# Patient Record
Sex: FEMALE | Race: WHITE | Employment: UNEMPLOYED | URBAN - METROPOLITAN AREA
[De-identification: names, ages, dates, MRNs, and addresses within clinical notes are randomized per-mention and may not be internally consistent; named-entity substitution may affect disease eponyms.]

---
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
## 2022-02-20 ENCOUNTER — HOSPITAL ENCOUNTER (EMERGENCY)
Age: 22
Discharge: HOME | End: 2022-02-20
Attending: EMERGENCY MEDICINE
Payer: COMMERCIAL

## 2022-02-20 VITALS
SYSTOLIC BLOOD PRESSURE: 132 MMHG | TEMPERATURE: 98.2 F | WEIGHT: 180 LBS | OXYGEN SATURATION: 97 % | RESPIRATION RATE: 18 BRPM | HEIGHT: 62 IN | DIASTOLIC BLOOD PRESSURE: 68 MMHG | HEART RATE: 63 BPM | BODY MASS INDEX: 33.13 KG/M2

## 2022-02-20 DIAGNOSIS — R11.2 INTRACTABLE VOMITING WITH NAUSEA, UNSPECIFIED VOMITING TYPE: ICD-10-CM

## 2022-02-20 DIAGNOSIS — F41.1 ANXIETY STATE: Primary | ICD-10-CM

## 2022-02-20 LAB
ANION GAP SERPL CALCULATED.3IONS-SCNC: 17 MEQ/L (ref 9–15)
BACTERIA: ABNORMAL /HPF
BASOPHILS ABSOLUTE: 0 K/UL (ref 0–0.1)
BASOPHILS RELATIVE PERCENT: 0.3 % (ref 0.1–1.2)
BILIRUBIN URINE: NEGATIVE
BLOOD, URINE: NEGATIVE
BUN BLDV-MCNC: 10 MG/DL (ref 6–20)
CALCIUM SERPL-MCNC: 9.3 MG/DL (ref 8.5–9.9)
CHLORIDE BLD-SCNC: 103 MEQ/L (ref 95–107)
CLARITY: CLEAR
CO2: 22 MEQ/L (ref 20–31)
COLOR: YELLOW
CREAT SERPL-MCNC: 0.74 MG/DL (ref 0.5–0.9)
EOSINOPHILS ABSOLUTE: 0 K/UL (ref 0–0.4)
EOSINOPHILS RELATIVE PERCENT: 0 % (ref 0.7–5.8)
EPITHELIAL CELLS, UA: ABNORMAL /HPF
GFR AFRICAN AMERICAN: >60
GFR NON-AFRICAN AMERICAN: >60
GLUCOSE BLD-MCNC: 95 MG/DL (ref 70–99)
GLUCOSE URINE: NEGATIVE MG/DL
HCG(URINE) PREGNANCY TEST: NEGATIVE
HCT VFR BLD CALC: 35.9 % (ref 37–47)
HEMOGLOBIN: 11.8 G/DL (ref 11.2–15.7)
IMMATURE GRANULOCYTES #: 0.1 K/UL
IMMATURE GRANULOCYTES %: 0.6 %
KETONES, URINE: 80 MG/DL
LEUKOCYTE ESTERASE, URINE: NEGATIVE
LYMPHOCYTES ABSOLUTE: 0.7 K/UL (ref 1.2–3.7)
LYMPHOCYTES RELATIVE PERCENT: 5.1 %
MCH RBC QN AUTO: 28.1 PG (ref 25.6–32.2)
MCHC RBC AUTO-ENTMCNC: 32.9 % (ref 32.2–35.5)
MCV RBC AUTO: 85.5 FL (ref 79.4–94.8)
MONOCYTES ABSOLUTE: 0.4 K/UL (ref 0.2–0.9)
MONOCYTES RELATIVE PERCENT: 3 % (ref 4.7–12.5)
NEUTROPHILS ABSOLUTE: 13.3 K/UL (ref 1.6–6.1)
NEUTROPHILS RELATIVE PERCENT: 91 % (ref 34–71.1)
NITRITE, URINE: NEGATIVE
PDW BLD-RTO: 13.3 % (ref 11.7–14.4)
PH UA: >=9 (ref 5–9)
PLATELET # BLD: 463 K/UL (ref 182–369)
POTASSIUM SERPL-SCNC: 3.7 MEQ/L (ref 3.4–4.9)
PROTEIN UA: 100 MG/DL
RBC # BLD: 4.2 M/UL (ref 3.93–5.22)
RBC UA: ABNORMAL /HPF (ref 0–2)
SODIUM BLD-SCNC: 142 MEQ/L (ref 135–144)
SPECIFIC GRAVITY UA: 1.01 (ref 1–1.03)
URINE REFLEX TO CULTURE: ABNORMAL
UROBILINOGEN, URINE: 0.2 E.U./DL
WBC # BLD: 14.6 K/UL (ref 4–10)
WBC UA: ABNORMAL /HPF (ref 0–5)

## 2022-02-20 PROCEDURE — 85025 COMPLETE CBC W/AUTO DIFF WBC: CPT

## 2022-02-20 PROCEDURE — 36415 COLL VENOUS BLD VENIPUNCTURE: CPT

## 2022-02-20 PROCEDURE — 2580000003 HC RX 258: Performed by: EMERGENCY MEDICINE

## 2022-02-20 PROCEDURE — 80048 BASIC METABOLIC PNL TOTAL CA: CPT

## 2022-02-20 PROCEDURE — 81001 URINALYSIS AUTO W/SCOPE: CPT

## 2022-02-20 PROCEDURE — 99282 EMERGENCY DEPT VISIT SF MDM: CPT

## 2022-02-20 PROCEDURE — 96374 THER/PROPH/DIAG INJ IV PUSH: CPT

## 2022-02-20 PROCEDURE — 6360000002 HC RX W HCPCS: Performed by: EMERGENCY MEDICINE

## 2022-02-20 PROCEDURE — 84703 CHORIONIC GONADOTROPIN ASSAY: CPT

## 2022-02-20 RX ORDER — ONDANSETRON 4 MG/1
4 TABLET, ORALLY DISINTEGRATING ORAL EVERY 8 HOURS PRN
Qty: 30 TABLET | Refills: 0 | Status: SHIPPED | OUTPATIENT
Start: 2022-02-20 | End: 2022-03-27

## 2022-02-20 RX ORDER — ONDANSETRON 2 MG/ML
4 INJECTION INTRAMUSCULAR; INTRAVENOUS ONCE
Status: COMPLETED | OUTPATIENT
Start: 2022-02-20 | End: 2022-02-20

## 2022-02-20 RX ORDER — 0.9 % SODIUM CHLORIDE 0.9 %
1000 INTRAVENOUS SOLUTION INTRAVENOUS ONCE
Status: COMPLETED | OUTPATIENT
Start: 2022-02-20 | End: 2022-02-20

## 2022-02-20 RX ORDER — SODIUM CHLORIDE 0.9 % (FLUSH) 0.9 %
3 SYRINGE (ML) INJECTION EVERY 8 HOURS
Status: DISCONTINUED | OUTPATIENT
Start: 2022-02-20 | End: 2022-02-20 | Stop reason: HOSPADM

## 2022-02-20 RX ADMIN — Medication 3 ML: at 17:38

## 2022-02-20 RX ADMIN — SODIUM CHLORIDE 1000 ML: 9 INJECTION, SOLUTION INTRAVENOUS at 17:38

## 2022-02-20 RX ADMIN — ONDANSETRON 4 MG: 2 INJECTION INTRAMUSCULAR; INTRAVENOUS at 17:38

## 2022-02-20 ASSESSMENT — ENCOUNTER SYMPTOMS
NAUSEA: 1
SORE THROAT: 0
EYE DISCHARGE: 0
SHORTNESS OF BREATH: 0
ABDOMINAL PAIN: 1
COUGH: 0
BACK PAIN: 0
VOMITING: 1
EYE REDNESS: 0

## 2022-02-20 ASSESSMENT — PAIN DESCRIPTION - DESCRIPTORS: DESCRIPTORS: STABBING

## 2022-02-20 ASSESSMENT — PAIN SCALES - GENERAL: PAINLEVEL_OUTOF10: 5

## 2022-02-20 ASSESSMENT — PAIN DESCRIPTION - LOCATION: LOCATION: ABDOMEN;THROAT

## 2022-02-20 ASSESSMENT — PAIN DESCRIPTION - FREQUENCY: FREQUENCY: CONTINUOUS

## 2022-02-20 ASSESSMENT — PAIN DESCRIPTION - ONSET: ONSET: ON-GOING

## 2022-02-20 ASSESSMENT — PAIN DESCRIPTION - PAIN TYPE: TYPE: ACUTE PAIN

## 2022-02-20 NOTE — ED PROVIDER NOTES
[address] [place] [address] ED  EMERGENCY DEPARTMENT ENCOUNTER      Pt Name: Ameena Moore  MRN: [id number]  [place] 2000  Date of evaluation: 2/20/2022  Provider: Benedicto Ruiz DO        HISTORY OF PRESENT ILLNESS    Ameena Moore is a 25 y.o. female per chart review has ah/o depression, anxiety, otitis media, URI, verucca. Presents with nausea and vomiting from using marijuana. The history is provided by the patient. Nausea & Vomiting  Severity:  Moderate  Duration:  12 hours  Timing:  Constant  Quality:  Undigested food  Progression:  Unchanged  Chronicity:  New  Recent urination:  Normal  Relieved by:  Nothing  Worsened by:  Nothing  Ineffective treatments:  None tried  Associated symptoms: abdominal pain    Associated symptoms: no chills, no cough, no fever and no sore throat             REVIEW OF SYSTEMS       Review of Systems   Constitutional: Negative for chills and fever. HENT: Negative for ear pain and sore throat. Eyes: Negative for discharge and redness. Respiratory: Negative for cough and shortness of breath. Cardiovascular: Negative for chest pain and palpitations. Gastrointestinal: Positive for abdominal pain, nausea and vomiting. Genitourinary: Negative for difficulty urinating and dysuria. Musculoskeletal: Negative for back pain and neck pain. Skin: Negative for rash and wound. Neurological: Negative for dizziness and syncope. Psychiatric/Behavioral: Negative for confusion. The patient is not nervous/anxious. All other systems reviewed and are negative. Except as noted above the remainder of the review of systems was reviewed and negative.        PAST MEDICAL HISTORY     Past Medical History:   Diagnosis Date    Eustachian tube dysfunction     Otitis media     URI (upper respiratory infection)     Verruca     right distal phalanax         SURGICAL HISTORY       Past Surgical History:   Procedure Laterality Date    ANKLE FRACTURE SURGERY Right 2/3/2021 RIGHT ANKLE OPEN REDUCTION INTERNAL FIXATION, I & D OPEN FRACTURE performed by Ana Maria Martinez MD at [address] [facility]       Discharge Medication List as of 2022  6:42 PM      CONTINUE these medications which have NOT CHANGED    Details   Handicap Placard MISC Starting Wed 3/31/2021, Disp-1 each, R-0, Print3 months      dicyclomine (BENTYL) 10 MG capsule Take 1 capsule by mouth every 6 hours as needed (cramps), Disp-20 capsule, R-0Normal      ondansetron (ZOFRAN) 4 MG tablet Take 1 tablet by mouth every 8 hours as needed for Nausea, Disp-20 tablet, R-0Normal      naproxen (NAPROSYN) 500 MG tablet Take 1 tablet by mouth 2 times daily for 20 doses, Disp-20 tablet, R-0Normal      oxyCODONE-acetaminophen (PERCOCET) 5-325 MG per tablet Take 1 tablet by mouth every 6 hours as needed for Pain. Historical Med             ALLERGIES     Patient has no known allergies.     FAMILY HISTORY       Family History   Problem Relation Age of Onset    Stroke Father     Heart Disease Father          at age 44 Afib, blood clot          SOCIAL HISTORY       Social History     Socioeconomic History    Marital status: Single     Spouse name: Not on file    Number of children: Not on file    Years of education: Not on file    Highest education level: Not on file   Occupational History    Not on file   Tobacco Use    Smoking status: Current Every Day Smoker     Packs/day: 0.50     Years: 1.00     Pack years: 0.50    Smokeless tobacco: Never Used   Vaping Use    Vaping Use: Never used   Substance and Sexual Activity    Alcohol use: No    Drug use: Yes     Types: Marijuana Fredick Copping)    Sexual activity: Yes     Partners: Male   Other Topics Concern    Not on file   Social History Narrative    Not on file     Social Determinants of Health     Financial Resource Strain:     Difficulty of Paying Living Expenses: Not on file   Food Insecurity:     Worried About Running Out of Food in the Last Year: Not on file    [address] in the Last Year: Not on file   Transportation Needs:     Lack of Transportation (Medical): Not on file    Lack of Transportation (Non-Medical): Not on file   Physical Activity:     Days of Exercise per Week: Not on file    Minutes of Exercise per Session: Not on file   Stress:     Feeling of Stress : Not on file   Social Connections:     Frequency of Communication with Friends and Family: Not on file    Frequency of Social Gatherings with Friends and Family: Not on file    Attends Islam Services: Not on file    Active Member of [address] [place] [address] [employer] or Organizations: Not on file    Attends Club or Organization Meetings: Not on file    Marital Status: Not on file   Intimate Partner Violence:     Fear of Current or Ex-Partner: Not on file    Emotionally Abused: Not on file    Physically Abused: Not on file    Sexually Abused: Not on file   Housing Stability:     Unable to Pay for Housing in the Last Year: Not on file    Number of [place] in the Last Year: Not on file    Unstable Housing in the Last Year: Not on file         PHYSICAL EXAM       ED Triage Vitals [02/20/22 1621]   BP Temp Temp Source Heart Rate Resp SpO2 Height Weight - Scale   132/68 98.2 °F (36.8 °C) Temporal 63 18 97 % 5' 2\" (1.575 m) 180 lb (81.6 kg)       Physical Exam  Vitals and nursing note reviewed. Constitutional:       Appearance: Normal appearance. HENT:      Head: Normocephalic and atraumatic. Right Ear: Tympanic membrane normal.      Left Ear: Tympanic membrane normal.      Nose: Nose normal.      Mouth/Throat:      Mouth: Mucous membranes are moist.      Pharynx: Oropharynx is clear. Eyes:      General: Lids are normal.      Extraocular Movements: Extraocular movements intact. Conjunctiva/sclera: Conjunctivae normal.      Pupils: Pupils are equal, round, and reactive to light. Cardiovascular:      Rate and Rhythm: Normal rate and regular rhythm. Pulses: Normal pulses.       Heart Options  Intractable vomiting with nausea, unspecified vomiting type  Diagnosis management comments: Patient presents with nausea, vomiting from using marijuana. Labs, IVF and zofran 4mg IV ordered. The patient feels much better after the medication. She will be discharged home. She will follow up in 2 days with her primary care doctor. Mio Dudley DO     The lab results, radiology and test results were reviewed with the patient and family. The patient will follow up in 2 days with their primary care doctor. If their symptoms change or get worse they will return to the ER. CRITICAL CARE TIME   Total CriticalCare time was 0 minutes, excluding separately reportable procedures. There was a high probability of clinically significant/life threatening deterioration in the patient's condition which required my urgent intervention. PROCEDURES:  Unlessotherwise noted below, none     Procedures      FINAL IMPRESSION      1. Anxiety state    2.  Intractable vomiting with nausea, unspecified vomiting type          DISPOSITION/PLAN   DISPOSITION Decision To Discharge 02/20/2022 06:27:47 PM          Mio Dudley DO (electronically signed)  Attending Emergency Physician          Dustin Trotter DO  02/23/22 2003 overriding sutures, vaginal discharge

## 2022-02-20 NOTE — LETTER
[facility] ED  [address]  Phone: [phone]    Patient: Sidney Zuleta  YOB: 2000  Date: 2/20/2022 Time: 6:28 PM    Leaving the Hospital Against Medical Advice    Chart #:[id number]    This will certify that I, the undersigned,    ______________________________________________________________________    A patient in the above named medical center, having requested discharge and removal from the medical center against the advice of my attending physician(s), hereby release the Emergency Department, its physicians, officers and employees, severally and individually, from any and all liability of any nature whatsoever for any injury or harm or complication of any kind that may result directly or indirectly, by reason of my terminating my stay as a patient from [facility], and hereby waive any and all rights of action I may now have or later acquire as a result of my voluntary departure from [facility] and the termination of my stay as a patient therein. This release is made with the full knowledge of the danger that may result from the action which I am taking.       Date:_______________________                         ___________________________                                                                                    Patient/Legal Representative    Witness:        ____________________________                          ___________________________  Nurse                                                                        Physician

## 2022-02-20 NOTE — ED NOTES
Pt is given d/c instructions and one script. Pt voiced understanding of d/c instructions without further questions.       Smooth Garcia RN  02/20/22 4826

## 2022-03-27 ENCOUNTER — HOSPITAL ENCOUNTER (EMERGENCY)
Age: 22
Discharge: HOME | End: 2022-03-27
Attending: EMERGENCY MEDICINE

## 2022-03-27 ENCOUNTER — APPOINTMENT (OUTPATIENT)
Dept: GENERAL RADIOLOGY | Age: 22
End: 2022-03-27

## 2022-03-27 VITALS
HEIGHT: 62 IN | BODY MASS INDEX: 33.13 KG/M2 | OXYGEN SATURATION: 98 % | RESPIRATION RATE: 20 BRPM | WEIGHT: 180 LBS | SYSTOLIC BLOOD PRESSURE: 133 MMHG | DIASTOLIC BLOOD PRESSURE: 84 MMHG | TEMPERATURE: 97.2 F | HEART RATE: 73 BPM

## 2022-03-27 DIAGNOSIS — R10.9 ABDOMINAL PAIN, UNSPECIFIED ABDOMINAL LOCATION: Primary | ICD-10-CM

## 2022-03-27 DIAGNOSIS — R11.2 NON-INTRACTABLE VOMITING WITH NAUSEA, UNSPECIFIED VOMITING TYPE: ICD-10-CM

## 2022-03-27 LAB
ALBUMIN SERPL-MCNC: 4.3 G/DL (ref 3.5–4.6)
ALP BLD-CCNC: 140 U/L (ref 40–130)
ALT SERPL-CCNC: 14 U/L (ref 0–33)
ANION GAP SERPL CALCULATED.3IONS-SCNC: 18 MEQ/L (ref 9–15)
AST SERPL-CCNC: 20 U/L (ref 0–35)
BASOPHILS ABSOLUTE: 0.1 K/UL (ref 0–0.1)
BASOPHILS RELATIVE PERCENT: 0.4 % (ref 0.1–1.2)
BILIRUB SERPL-MCNC: 0.3 MG/DL (ref 0.2–0.7)
BUN BLDV-MCNC: 10 MG/DL (ref 6–20)
CALCIUM SERPL-MCNC: 9.2 MG/DL (ref 8.5–9.9)
CHLORIDE BLD-SCNC: 101 MEQ/L (ref 95–107)
CO2: 21 MEQ/L (ref 20–31)
CREAT SERPL-MCNC: 0.86 MG/DL (ref 0.5–0.9)
EOSINOPHILS ABSOLUTE: 0.1 K/UL (ref 0–0.4)
EOSINOPHILS RELATIVE PERCENT: 0.8 % (ref 0.7–5.8)
GFR AFRICAN AMERICAN: >60
GFR NON-AFRICAN AMERICAN: >60
GLOBULIN: 3 G/DL (ref 2.3–3.5)
GLUCOSE BLD-MCNC: 132 MG/DL (ref 70–99)
HCG QUALITATIVE: NEGATIVE
HCT VFR BLD CALC: 39.8 % (ref 37–47)
HEMOGLOBIN: 13.3 G/DL (ref 11.2–15.7)
IMMATURE GRANULOCYTES #: 0.1 K/UL
IMMATURE GRANULOCYTES %: 0.6 %
LIPASE: 26 U/L (ref 12–95)
LYMPHOCYTES ABSOLUTE: 2.2 K/UL (ref 1.2–3.7)
LYMPHOCYTES RELATIVE PERCENT: 15.3 %
MAGNESIUM: 1.8 MG/DL (ref 1.7–2.2)
MCH RBC QN AUTO: 27.9 PG (ref 25.6–32.2)
MCHC RBC AUTO-ENTMCNC: 33.4 % (ref 32.2–35.5)
MCV RBC AUTO: 83.6 FL (ref 79.4–94.8)
MONOCYTES ABSOLUTE: 0.8 K/UL (ref 0.2–0.9)
MONOCYTES RELATIVE PERCENT: 5.2 % (ref 4.7–12.5)
NEUTROPHILS ABSOLUTE: 11.3 K/UL (ref 1.6–6.1)
NEUTROPHILS RELATIVE PERCENT: 77.7 % (ref 34–71.1)
PDW BLD-RTO: 14.6 % (ref 11.7–14.4)
PLATELET # BLD: 436 K/UL (ref 182–369)
POTASSIUM SERPL-SCNC: 3.9 MEQ/L (ref 3.4–4.9)
RBC # BLD: 4.76 M/UL (ref 3.93–5.22)
SODIUM BLD-SCNC: 140 MEQ/L (ref 135–144)
TOTAL PROTEIN: 7.3 G/DL (ref 6.3–8)
WBC # BLD: 14.5 K/UL (ref 4–10)

## 2022-03-27 PROCEDURE — 96374 THER/PROPH/DIAG INJ IV PUSH: CPT

## 2022-03-27 PROCEDURE — 84703 CHORIONIC GONADOTROPIN ASSAY: CPT

## 2022-03-27 PROCEDURE — 36415 COLL VENOUS BLD VENIPUNCTURE: CPT

## 2022-03-27 PROCEDURE — 80053 COMPREHEN METABOLIC PANEL: CPT

## 2022-03-27 PROCEDURE — 93005 ELECTROCARDIOGRAM TRACING: CPT

## 2022-03-27 PROCEDURE — 96375 TX/PRO/DX INJ NEW DRUG ADDON: CPT

## 2022-03-27 PROCEDURE — 74018 RADEX ABDOMEN 1 VIEW: CPT

## 2022-03-27 PROCEDURE — 83690 ASSAY OF LIPASE: CPT

## 2022-03-27 PROCEDURE — 83735 ASSAY OF MAGNESIUM: CPT

## 2022-03-27 PROCEDURE — 85025 COMPLETE CBC W/AUTO DIFF WBC: CPT

## 2022-03-27 PROCEDURE — 99283 EMERGENCY DEPT VISIT LOW MDM: CPT

## 2022-03-27 PROCEDURE — 6360000002 HC RX W HCPCS: Performed by: EMERGENCY MEDICINE

## 2022-03-27 PROCEDURE — 2580000003 HC RX 258: Performed by: EMERGENCY MEDICINE

## 2022-03-27 PROCEDURE — 96372 THER/PROPH/DIAG INJ SC/IM: CPT

## 2022-03-27 RX ORDER — KETOROLAC TROMETHAMINE 10 MG/1
10 TABLET, FILM COATED ORAL EVERY 6 HOURS PRN
Qty: 20 TABLET | Refills: 0 | Status: SHIPPED | OUTPATIENT
Start: 2022-03-27

## 2022-03-27 RX ORDER — KETOROLAC TROMETHAMINE 30 MG/ML
30 INJECTION, SOLUTION INTRAMUSCULAR; INTRAVENOUS ONCE
Status: COMPLETED | OUTPATIENT
Start: 2022-03-27 | End: 2022-03-27

## 2022-03-27 RX ORDER — ONDANSETRON 4 MG/1
4 TABLET, ORALLY DISINTEGRATING ORAL 3 TIMES DAILY PRN
Qty: 21 TABLET | Refills: 0 | Status: SHIPPED | OUTPATIENT
Start: 2022-03-27

## 2022-03-27 RX ORDER — DIPHENHYDRAMINE HYDROCHLORIDE 50 MG/ML
25 INJECTION INTRAMUSCULAR; INTRAVENOUS ONCE
Status: COMPLETED | OUTPATIENT
Start: 2022-03-27 | End: 2022-03-27

## 2022-03-27 RX ORDER — HALOPERIDOL 5 MG/ML
5 INJECTION INTRAMUSCULAR ONCE
Status: DISCONTINUED | OUTPATIENT
Start: 2022-03-27 | End: 2022-03-27

## 2022-03-27 RX ORDER — METOCLOPRAMIDE HYDROCHLORIDE 5 MG/ML
10 INJECTION INTRAMUSCULAR; INTRAVENOUS ONCE
Status: COMPLETED | OUTPATIENT
Start: 2022-03-27 | End: 2022-03-27

## 2022-03-27 RX ORDER — PROMETHAZINE HYDROCHLORIDE 25 MG/1
25 TABLET ORAL 4 TIMES DAILY PRN
Qty: 20 TABLET | Refills: 0 | Status: SHIPPED | OUTPATIENT
Start: 2022-03-27 | End: 2022-04-03

## 2022-03-27 RX ORDER — HALOPERIDOL 5 MG/ML
5 INJECTION INTRAMUSCULAR ONCE
Status: COMPLETED | OUTPATIENT
Start: 2022-03-27 | End: 2022-03-27

## 2022-03-27 RX ORDER — MORPHINE SULFATE 4 MG/ML
4 INJECTION, SOLUTION INTRAMUSCULAR; INTRAVENOUS
Status: DISCONTINUED | OUTPATIENT
Start: 2022-03-27 | End: 2022-03-27 | Stop reason: HOSPADM

## 2022-03-27 RX ORDER — 0.9 % SODIUM CHLORIDE 0.9 %
1000 INTRAVENOUS SOLUTION INTRAVENOUS ONCE
Status: COMPLETED | OUTPATIENT
Start: 2022-03-27 | End: 2022-03-27

## 2022-03-27 RX ORDER — PROMETHAZINE HYDROCHLORIDE 25 MG/ML
25 INJECTION, SOLUTION INTRAMUSCULAR; INTRAVENOUS ONCE
Status: COMPLETED | OUTPATIENT
Start: 2022-03-27 | End: 2022-03-27

## 2022-03-27 RX ADMIN — KETOROLAC TROMETHAMINE 30 MG: 30 INJECTION, SOLUTION INTRAMUSCULAR; INTRAVENOUS at 09:01

## 2022-03-27 RX ADMIN — SODIUM CHLORIDE 1000 ML: 9 INJECTION, SOLUTION INTRAVENOUS at 09:00

## 2022-03-27 RX ADMIN — HALOPERIDOL LACTATE 5 MG: 5 INJECTION, SOLUTION INTRAMUSCULAR at 09:02

## 2022-03-27 RX ADMIN — METOCLOPRAMIDE HYDROCHLORIDE 10 MG: 5 INJECTION INTRAMUSCULAR; INTRAVENOUS at 10:29

## 2022-03-27 RX ADMIN — DIPHENHYDRAMINE HYDROCHLORIDE 25 MG: 50 INJECTION, SOLUTION INTRAMUSCULAR; INTRAVENOUS at 10:29

## 2022-03-27 RX ADMIN — MORPHINE SULFATE 4 MG: 4 INJECTION, SOLUTION INTRAMUSCULAR; INTRAVENOUS at 10:29

## 2022-03-27 RX ADMIN — PROMETHAZINE HYDROCHLORIDE 25 MG: 25 INJECTION INTRAMUSCULAR; INTRAVENOUS at 09:02

## 2022-03-27 ASSESSMENT — PAIN DESCRIPTION - PAIN TYPE: TYPE: ACUTE PAIN

## 2022-03-27 ASSESSMENT — ENCOUNTER SYMPTOMS
ABDOMINAL PAIN: 1
SORE THROAT: 0
VOMITING: 1
BACK PAIN: 0
SHORTNESS OF BREATH: 0
NAUSEA: 1
DIARRHEA: 0
COUGH: 0

## 2022-03-27 ASSESSMENT — PAIN SCALES - GENERAL
PAINLEVEL_OUTOF10: 4
PAINLEVEL_OUTOF10: 8
PAINLEVEL_OUTOF10: 4

## 2022-03-27 ASSESSMENT — PAIN DESCRIPTION - LOCATION: LOCATION: ABDOMEN

## 2022-03-27 ASSESSMENT — PAIN DESCRIPTION - ORIENTATION: ORIENTATION: LOWER

## 2022-03-27 NOTE — ED PROVIDER NOTES
[facility] ED  eMERGENCYdEPARTMENT eNCOUnter      Pt Name: Nettie Weinstein  MRN: [id number]  [place] 2000  Date of evaluation: 3/27/2022  Yina Nur MD    CHIEF COMPLAINT           HPI  Nettie Weinstein is a 25 y.o. female per chart review has a h/o  No pmh presents to the ED with ab pain, n/v.  Pt notes gradual onset, severe, constant, stabbing, lower ab pain since this am.  +N/v.  Pt notes she smokes marijuana last night and has had hyperemesis before. Pt states that it feels like that. Pt denies fever, cp, sob, dysuria, diarrhea. ROS  Review of Systems   Constitutional: Negative for activity change, chills and fever. HENT: Negative for ear pain and sore throat. Eyes: Negative for visual disturbance. Respiratory: Negative for cough and shortness of breath. Cardiovascular: Negative for chest pain, palpitations and leg swelling. Gastrointestinal: Positive for abdominal pain, nausea and vomiting. Negative for diarrhea. Genitourinary: Negative for dysuria. Musculoskeletal: Negative for back pain. Skin: Negative for rash. Neurological: Negative for dizziness and weakness. Except as noted above the remainder of the review of systems was reviewed and negative.        PAST MEDICAL HISTORY     Past Medical History:   Diagnosis Date    Cannabis abuse     Eustachian tube dysfunction     Otitis media     URI (upper respiratory infection)     Verruca     right distal phalanax         SURGICAL HISTORY       Past Surgical History:   Procedure Laterality Date    ANKLE FRACTURE SURGERY Right 2/3/2021    RIGHT ANKLE OPEN REDUCTION INTERNAL FIXATION, I & D OPEN FRACTURE performed by Cj Lozano MD at [address] [facility]       Previous Medications    DICYCLOMINE (BENTYL) 10 MG CAPSULE    Take 1 capsule by mouth every 6 hours as needed (cramps)    HANDICAP PLACARD MISC    by Does not apply route 3 months    NAPROXEN (NAPROSYN) 500 MG TABLET Take 1 tablet by mouth 2 times daily for 20 doses    ONDANSETRON (ZOFRAN) 4 MG TABLET    Take 1 tablet by mouth every 8 hours as needed for Nausea    OXYCODONE-ACETAMINOPHEN (PERCOCET) 5-325 MG PER TABLET    Take 1 tablet by mouth every 6 hours as needed for Pain. ALLERGIES     Patient has no known allergies. FAMILY HISTORY       Family History   Problem Relation Age of Onset    Stroke Father     Heart Disease Father          at age 44 Afib, blood clot          SOCIAL HISTORY       Social History     Socioeconomic History    Marital status: Single     Spouse name: Not on file    Number of children: Not on file    Years of education: Not on file    Highest education level: Not on file   Occupational History    Not on file   Tobacco Use    Smoking status: Current Every Day Smoker     Packs/day: 0.50     Years: 1.00     Pack years: 0.50    Smokeless tobacco: Never Used   Vaping Use    Vaping Use: Never used   Substance and Sexual Activity    Alcohol use: No    Drug use: Yes     Types: Marijuana Fabiana Coho)    Sexual activity: Yes     Partners: Male   Other Topics Concern    Not on file   Social History Narrative    Not on file     Social Determinants of Health     Financial Resource Strain:     Difficulty of Paying Living Expenses: Not on file   Food Insecurity:     Worried About Running Out of Food in the Last Year: Not on file    [place] of Food in the Last Year: Not on file   Transportation Needs:     Lack of Transportation (Medical): Not on file    Lack of Transportation (Non-Medical):  Not on file   Physical Activity:     Days of Exercise per Week: Not on file    Minutes of Exercise per Session: Not on file   Stress:     Feeling of Stress : Not on file   Social Connections:     Frequency of Communication with Friends and Family: Not on file    Frequency of Social Gatherings with Friends and Family: Not on file    Attends Gnosticism Services: Not on file   [employer] or Organizations: Not on file    Attends Club or Organization Meetings: Not on file    Marital Status: Not on file   Intimate Partner Violence:     Fear of Current or Ex-Partner: Not on file    Emotionally Abused: Not on file    Physically Abused: Not on file    Sexually Abused: Not on file   Housing Stability:     Unable to Pay for Housing in the Last Year: Not on file    Number of [place] in the Last Year: Not on file    Unstable Housing in the Last Year: Not on file         PHYSICAL EXAM       ED Triage Vitals [03/27/22 0845]   BP Temp Temp src Pulse Resp SpO2 Height Weight   -- -- -- -- 20 -- 5' 2\" (1.575 m) 180 lb (81.6 kg)       Physical Exam  Vitals and nursing note reviewed. Constitutional:       Appearance: She is well-developed. HENT:      Head: Normocephalic. Right Ear: External ear normal.      Left Ear: External ear normal.   Eyes:      Conjunctiva/sclera: Conjunctivae normal.      Pupils: Pupils are equal, round, and reactive to light. Cardiovascular:      Rate and Rhythm: Normal rate and regular rhythm. Heart sounds: Normal heart sounds. Pulmonary:      Effort: Pulmonary effort is normal.      Breath sounds: Normal breath sounds. Abdominal:      Comments: Soft, nondistended. Moderate tenderness to palpation in lower abdomen. No rebound, guarding, peritoneal signs. Musculoskeletal:         General: Normal range of motion. Cervical back: Normal range of motion and neck supple. Skin:     General: Skin is warm and dry. Neurological:      Mental Status: She is alert and oriented to person, place, and time. Psychiatric:      Comments: Labile affect, anxious, crying           MDM  26 yo female presents to the ED with ab pain, n/v after smoking marijuana. Pt is afebrile, hemodynamically stable. Pt given 1 L NS, IM haldol, IM phenergan, IV toradol, IV pepcid with moderate relief. EKG shows NSR with HR 82, normal axis, QTc 82, no ST changes.   Labs remarkable for WBC 14. KUB negative. Pt reassessed and feels better however still has pain and n/v.  Pt given IV morphine, IV reglan, IV benadryl. Pt reassessed and feels much better. Pt tolerating PO water. Pt counseled on marijuana abuse and cyclical vomiting syndrome. Pt given ab pain, n/v warning signs and will f/uw with pcp. Pt understands plan. FINAL IMPRESSION      1. Abdominal pain, unspecified abdominal location    2.  Non-intractable vomiting with nausea, unspecified vomiting type          DISPOSITION/PLAN   DISPOSITION Decision To Discharge 03/27/2022 10:04:40 AM        DISCHARGE MEDICATIONS:  [unfilled]         José Antonio Monsivais MD(electronically signed)  Attending Emergency Physician              José Antonio Monsivais MD  03/27/22 1007       José Antonio Monsivais MD  03/27/22 9586

## 2022-03-27 NOTE — ED TRIAGE NOTES
Patient complains of emesis and abdominal pain that started last night, she states it has happened before and admits to smoking marijuana.

## 2022-03-28 LAB
EKG ATRIAL RATE: 82 BPM
EKG P AXIS: 63 DEGREES
EKG P-R INTERVAL: 130 MS
EKG Q-T INTERVAL: 430 MS
EKG QRS DURATION: 78 MS
EKG QTC CALCULATION (BAZETT): 502 MS
EKG R AXIS: 61 DEGREES
EKG T AXIS: 1 DEGREES
EKG VENTRICULAR RATE: 82 BPM

## 2022-03-28 PROCEDURE — 93010 ELECTROCARDIOGRAM REPORT: CPT | Performed by: INTERNAL MEDICINE

## 2022-04-18 ENCOUNTER — HOSPITAL ENCOUNTER (EMERGENCY)
Age: 22
Discharge: HOME | End: 2022-04-18
Attending: EMERGENCY MEDICINE
Payer: COMMERCIAL

## 2022-04-18 ENCOUNTER — APPOINTMENT (OUTPATIENT)
Dept: GENERAL RADIOLOGY | Age: 22
End: 2022-04-18
Payer: COMMERCIAL

## 2022-04-18 VITALS
DIASTOLIC BLOOD PRESSURE: 78 MMHG | HEIGHT: 62 IN | HEART RATE: 80 BPM | OXYGEN SATURATION: 98 % | RESPIRATION RATE: 16 BRPM | WEIGHT: 180 LBS | BODY MASS INDEX: 33.13 KG/M2 | SYSTOLIC BLOOD PRESSURE: 124 MMHG | TEMPERATURE: 97.7 F

## 2022-04-18 DIAGNOSIS — M79.604 RIGHT LEG PAIN: ICD-10-CM

## 2022-04-18 DIAGNOSIS — R11.2 NAUSEA AND VOMITING, INTRACTABILITY OF VOMITING NOT SPECIFIED, UNSPECIFIED VOMITING TYPE: Primary | ICD-10-CM

## 2022-04-18 DIAGNOSIS — S39.012A STRAIN OF LUMBAR REGION, INITIAL ENCOUNTER: ICD-10-CM

## 2022-04-18 LAB
AMORPHOUS: ABNORMAL
AMPHETAMINE SCREEN, URINE: ABNORMAL
BACTERIA: ABNORMAL /HPF
BARBITURATE SCREEN URINE: ABNORMAL
BENZODIAZEPINE SCREEN, URINE: POSITIVE
BILIRUBIN URINE: NEGATIVE
BLOOD, URINE: ABNORMAL
CANNABINOID SCREEN URINE: POSITIVE
CLARITY: CLEAR
COCAINE METABOLITE SCREEN URINE: ABNORMAL
COLOR: YELLOW
EPITHELIAL CELLS, UA: ABNORMAL /HPF
GLUCOSE URINE: NEGATIVE MG/DL
HCG(URINE) PREGNANCY TEST: NEGATIVE
KETONES, URINE: >=160 MG/DL
LEUKOCYTE ESTERASE, URINE: NEGATIVE
Lab: ABNORMAL
NITRITE, URINE: NEGATIVE
OPIATE SCREEN URINE: ABNORMAL
PH UA: >=9 (ref 5–9)
PHENCYCLIDINE SCREEN URINE: ABNORMAL
PROTEIN UA: 30 MG/DL
RBC UA: ABNORMAL /HPF (ref 0–2)
SPECIFIC GRAVITY UA: 1.01 (ref 1–1.03)
URINE REFLEX TO CULTURE: ABNORMAL
UROBILINOGEN, URINE: 0.2 E.U./DL
WBC UA: ABNORMAL /HPF (ref 0–5)

## 2022-04-18 PROCEDURE — 81001 URINALYSIS AUTO W/SCOPE: CPT

## 2022-04-18 PROCEDURE — 72110 X-RAY EXAM L-2 SPINE 4/>VWS: CPT

## 2022-04-18 PROCEDURE — 73590 X-RAY EXAM OF LOWER LEG: CPT

## 2022-04-18 PROCEDURE — 84703 CHORIONIC GONADOTROPIN ASSAY: CPT

## 2022-04-18 PROCEDURE — 99283 EMERGENCY DEPT VISIT LOW MDM: CPT

## 2022-04-18 PROCEDURE — 80306 DRUG TEST PRSMV INSTRMNT: CPT

## 2022-04-18 PROCEDURE — A4216 STERILE WATER/SALINE, 10 ML: HCPCS | Performed by: EMERGENCY MEDICINE

## 2022-04-18 PROCEDURE — 6360000002 HC RX W HCPCS: Performed by: EMERGENCY MEDICINE

## 2022-04-18 PROCEDURE — 2580000003 HC RX 258: Performed by: EMERGENCY MEDICINE

## 2022-04-18 PROCEDURE — 96361 HYDRATE IV INFUSION ADD-ON: CPT

## 2022-04-18 PROCEDURE — 96375 TX/PRO/DX INJ NEW DRUG ADDON: CPT

## 2022-04-18 PROCEDURE — 2500000003 HC RX 250 WO HCPCS: Performed by: EMERGENCY MEDICINE

## 2022-04-18 PROCEDURE — 96374 THER/PROPH/DIAG INJ IV PUSH: CPT

## 2022-04-18 RX ORDER — DIPHENHYDRAMINE HYDROCHLORIDE 50 MG/ML
25 INJECTION INTRAMUSCULAR; INTRAVENOUS ONCE
Status: COMPLETED | OUTPATIENT
Start: 2022-04-18 | End: 2022-04-18

## 2022-04-18 RX ORDER — SODIUM CHLORIDE 0.9 % (FLUSH) 0.9 %
3 SYRINGE (ML) INJECTION EVERY 8 HOURS
Status: DISCONTINUED | OUTPATIENT
Start: 2022-04-18 | End: 2022-04-18 | Stop reason: HOSPADM

## 2022-04-18 RX ORDER — LORAZEPAM 2 MG/ML
1 INJECTION INTRAMUSCULAR ONCE
Status: COMPLETED | OUTPATIENT
Start: 2022-04-18 | End: 2022-04-18

## 2022-04-18 RX ORDER — NAPROXEN 375 MG/1
375 TABLET ORAL 2 TIMES DAILY WITH MEALS
Qty: 30 TABLET | Refills: 5 | Status: SHIPPED | OUTPATIENT
Start: 2022-04-18

## 2022-04-18 RX ORDER — 0.9 % SODIUM CHLORIDE 0.9 %
1000 INTRAVENOUS SOLUTION INTRAVENOUS ONCE
Status: COMPLETED | OUTPATIENT
Start: 2022-04-18 | End: 2022-04-18

## 2022-04-18 RX ORDER — METOCLOPRAMIDE HYDROCHLORIDE 5 MG/ML
10 INJECTION INTRAMUSCULAR; INTRAVENOUS ONCE
Status: COMPLETED | OUTPATIENT
Start: 2022-04-18 | End: 2022-04-18

## 2022-04-18 RX ADMIN — LORAZEPAM 1 MG: 2 INJECTION INTRAMUSCULAR; INTRAVENOUS at 17:19

## 2022-04-18 RX ADMIN — SODIUM CHLORIDE 1000 ML: 9 INJECTION, SOLUTION INTRAVENOUS at 17:18

## 2022-04-18 RX ADMIN — METOCLOPRAMIDE HYDROCHLORIDE 10 MG: 5 INJECTION INTRAMUSCULAR; INTRAVENOUS at 17:19

## 2022-04-18 RX ADMIN — DIPHENHYDRAMINE HYDROCHLORIDE 25 MG: 50 INJECTION, SOLUTION INTRAMUSCULAR; INTRAVENOUS at 17:20

## 2022-04-18 RX ADMIN — SODIUM CHLORIDE, PRESERVATIVE FREE 3 ML: 5 INJECTION INTRAVENOUS at 17:20

## 2022-04-18 RX ADMIN — FAMOTIDINE 20 MG: 10 INJECTION INTRAVENOUS at 17:20

## 2022-04-18 ASSESSMENT — ENCOUNTER SYMPTOMS
CHEST TIGHTNESS: 0
FACIAL SWELLING: 0
STRIDOR: 0
VOMITING: 1
WHEEZING: 0
BLOOD IN STOOL: 0
ABDOMINAL PAIN: 1
BACK PAIN: 1
COUGH: 0
CONSTIPATION: 0
CHOKING: 0
NAUSEA: 1
SORE THROAT: 0
EYE REDNESS: 0
SHORTNESS OF BREATH: 0
DIARRHEA: 0
EYE PAIN: 0
TROUBLE SWALLOWING: 0
EYE DISCHARGE: 0
SINUS PRESSURE: 0
VOICE CHANGE: 0

## 2022-04-18 NOTE — ED TRIAGE NOTES
Patient presents to ED with c/o hyperemesis that started this morning after smoking weed.  She has a history of hyperemesis and has been treated for it in the past

## 2022-04-18 NOTE — ED PROVIDER NOTES
2000 \[facility]\"" ED  eMERGENCY dEPARTMENT eNCOUnter      Pt Name: Minh Souza  MRN: [id number]  [place] 2000  Date of evaluation: 4/18/2022  Provider: Sarah Lott MD    [address] [place] [address]       Chief Complaint   Patient presents with    Emesis     started this morning after smoking weed - she has had hyper emesis in the past    Back Pain     fell down stairs    Leg Pain     right leg          HISTORY OF PRESENT ILLNESS   (Location/Symptom, Timing/Onset,Context/Setting, Quality, Duration, Modifying Factors, Severity)  Note limiting factors. Minh Souza is a 25 y.o. female who presents to the emergency department patient with ongoing issue with the stomach nausea vomiting chronic ongoing with marijuana abuse along with anxiety depression obesity patient had a good Easter dinner yesterday as per patient no alcohol started having nausea vomiting patient also become weak and fell down the steps no head neck injury right leg pain back pain patient has no numbness into the arms or legs unable to keep them from started vomiting this morning so came for further evaluation    HPI    NursingNotes were reviewed. REVIEW OF SYSTEMS    (2-9 systems for level 4, 10 or more for level 5)     Review of Systems   Constitutional: Positive for activity change and appetite change. Negative for fever. HENT: Negative for congestion, drooling, facial swelling, mouth sores, nosebleeds, sinus pressure, sore throat, trouble swallowing and voice change. Eyes: Negative for pain, discharge, redness and visual disturbance. Respiratory: Negative for cough, choking, chest tightness, shortness of breath, wheezing and stridor. Cardiovascular: Negative for chest pain, palpitations and leg swelling. Gastrointestinal: Positive for abdominal pain, nausea and vomiting. Negative for blood in stool, constipation and diarrhea. Endocrine: Negative for cold intolerance, polyphagia and polyuria.    Genitourinary: Negative for dysuria, flank pain, frequency, genital sores and urgency. Musculoskeletal: Positive for arthralgias and back pain. Negative for joint swelling, neck pain and neck stiffness. Skin: Negative for pallor and rash. Neurological: Negative for tremors, seizures, syncope, weakness, numbness and headaches. Hematological: Negative for adenopathy. Does not bruise/bleed easily. Psychiatric/Behavioral: Negative for agitation, behavioral problems, hallucinations and sleep disturbance. The patient is nervous/anxious. The patient is not hyperactive. All other systems reviewed and are negative. Except as noted above the remainder of the review of systems was reviewed and negative. PAST MEDICAL HISTORY     Past Medical History:   Diagnosis Date    Cannabis abuse     Eustachian tube dysfunction     Otitis media     URI (upper respiratory infection)     Verruca     right distal phalanax         SURGICALHISTORY       Past Surgical History:   Procedure Laterality Date    ANKLE FRACTURE SURGERY Right 2/3/2021    RIGHT ANKLE OPEN REDUCTION INTERNAL FIXATION, I & D OPEN FRACTURE performed by Yazmin Valera MD at [facility]       Previous Medications    DICYCLOMINE (BENTYL) 10 MG CAPSULE    Take 1 capsule by mouth every 6 hours as needed (cramps)    HANDICAP PLACARD MISC    by Does not apply route 3 months    KETOROLAC (TORADOL) 10 MG TABLET    Take 1 tablet by mouth every 6 hours as needed for Pain    ONDANSETRON (ZOFRAN) 4 MG TABLET    Take 1 tablet by mouth every 8 hours as needed for Nausea    ONDANSETRON (ZOFRAN-ODT) 4 MG DISINTEGRATING TABLET    Take 1 tablet by mouth 3 times daily as needed for Nausea or Vomiting    OXYCODONE-ACETAMINOPHEN (PERCOCET) 5-325 MG PER TABLET    Take 1 tablet by mouth every 6 hours as needed for Pain. ALLERGIES     Patient has no known allergies.     FAMILY HISTORY       Family History   Problem Relation Age of Onset    Stroke Father     Heart Disease Father          at age 44 Afib, blood clot          SOCIAL HISTORY       Social History     Socioeconomic History    Marital status: Single     Spouse name: None    Number of children: None    Years of education: None    Highest education level: None   Occupational History    None   Tobacco Use    Smoking status: Current Every Day Smoker     Packs/day: 0.50     Years: 5.00     Pack years: 2.50     Types: Cigarettes, E-Cigarettes    Smokeless tobacco: Never Used   Vaping Use    Vaping Use: Never used   Substance and Sexual Activity    Alcohol use: No    Drug use: Yes     Types: Marijuana Zollie Axe)    Sexual activity: Yes     Partners: Male   Other Topics Concern    None   Social History Narrative    None     Social Determinants of Health     Financial Resource Strain:     Difficulty of Paying Living Expenses: Not on file   Food Insecurity:     Worried About Running Out of Food in the Last Year: Not on file    Bao of Food in the Last Year: Not on file   Transportation Needs:     Lack of Transportation (Medical): Not on file    Lack of Transportation (Non-Medical):  Not on file   Physical Activity:     Days of Exercise per Week: Not on file    Minutes of Exercise per Session: Not on file   Stress:     Feeling of Stress : Not on file   Social Connections:     Frequency of Communication with Friends and Family: Not on file    Frequency of Social Gatherings with Friends and Family: Not on file    Attends Baptism Services: Not on file    Active Member of Clubs or Organizations: Not on file    Attends Club or Organization Meetings: Not on file    Marital Status: Not on file   Intimate Partner Violence:     Fear of Current or Ex-Partner: Not on file    Emotionally Abused: Not on file    Physically Abused: Not on file    Sexually Abused: Not on file   Housing Stability:     Unable to Pay for Housing in the Last Year: Not on file    Number of [place] in the Last Year: Not on file    Unstable Housing in the Last Year: Not on file       SCREENINGS    [place] Coma Scale  Eye Opening: Spontaneous  Best Verbal Response: Oriented  Best Motor Response: Obeys commands  [place] Coma Scale Score: 15 @FLOW([id number])@      PHYSICAL EXAM    (up to 7 for level 4, 8 or more for level 5)     ED Triage Vitals   BP Temp Temp Source Heart Rate Resp SpO2 Height Weight   04/18/22 1658 04/18/22 1700 04/18/22 1700 04/18/22 1658 04/18/22 1658 04/18/22 1658 04/18/22 1658 04/18/22 1658   135/71 97.7 °F (36.5 °C) Temporal 67 20 97 % 5' 2\" (1.575 m) 180 lb (81.6 kg)       Physical Exam  Vitals and nursing note reviewed. Constitutional:       Appearance: She is obese. Comments:  cooperative patient unable to stay still very anxious at this time moving all extremities very well complaining of back pain and right leg pain no bleeding no head neck   HENT:      Head: Normocephalic and atraumatic. Right Ear: Tympanic membrane, ear canal and external ear normal.      Left Ear: Tympanic membrane, ear canal and external ear normal.      Nose: Nose normal.      Mouth/Throat:      Mouth: Mucous membranes are moist.      Pharynx: No oropharyngeal exudate or posterior oropharyngeal erythema. Eyes:      General:         Right eye: No discharge. Left eye: No discharge. Pupils: Pupils are equal, round, and reactive to light. Neck:      Vascular: No carotid bruit. Cardiovascular:      Rate and Rhythm: Normal rate and regular rhythm. Pulses: Normal pulses. Heart sounds: Normal heart sounds. No murmur heard. No friction rub. No gallop. Pulmonary:      Effort: No respiratory distress. Breath sounds: No stridor. No wheezing or rhonchi. Chest:      Chest wall: No tenderness. Abdominal:      General: There is no distension. Palpations: There is no mass. Tenderness: There is no abdominal tenderness.  There is no right CVA tenderness, left CVA tenderness, guarding or rebound. Hernia: No hernia is present. Musculoskeletal:         General: Tenderness present. No swelling or deformity. Cervical back: No rigidity or tenderness. Comments: Patient come to the lower extremities patient no deformity no shortening of the leg patient has no hematoma diffuse tenderness elicited on right lower leg patient straight leg raising is negative patient has a tension come the back patient has no hematoma no bruise no CVA tenderness elicited on examination diffusely spasm tenderness elicited on examination   Lymphadenopathy:      Cervical: No cervical adenopathy. Skin:     Capillary Refill: Capillary refill takes less than 2 seconds. Coloration: Skin is not jaundiced or pale. Findings: No bruising, lesion or rash. Neurological:      General: No focal deficit present. Mental Status: She is alert. Cranial Nerves: No cranial nerve deficit. Sensory: No sensory deficit. Motor: No weakness.       Coordination: Coordination normal.      Gait: Gait normal.      Deep Tendon Reflexes: Reflexes normal.         DIAGNOSTIC RESULTS     EKG: All EKG's are interpreted by the Emergency Department Physician who either signs or Co-signsthis chart in the absence of a cardiologist.        RADIOLOGY:   Charma Bourdon such as CT, Ultrasound and MRI are read by the radiologist. Plain radiographic images are visualized and preliminarily interpreted by the emergency physician with the below findings:        Interpretation per the Radiologist below, if available at the time ofthis note:    XR LUMBAR SPINE (MIN 4 VIEWS)    (Results Pending)   XR TIBIA FIBULA RIGHT (2 VIEWS)    (Results Pending)         ED BEDSIDE ULTRASOUND:   Performed by ED Physician - none    LABS:  Labs Reviewed   URINALYSIS WITH REFLEX TO CULTURE - Abnormal; Notable for the following components:       Result Value    Protein, UA 30 (*)     All other components within normal limits URINE DRUG SCREEN, COMPREHENSIVE - Abnormal; Notable for the following components:    Benzodiazepine Screen, Urine POSITIVE (*)     Cannabinoid Scrn, Ur POSITIVE (*)     All other components within normal limits   MICROSCOPIC URINALYSIS - Abnormal; Notable for the following components:    Bacteria, UA RARE (*)     All other components within normal limits   PREGNANCY, URINE       All other labs were within normal range or not returned as of this dictation. EMERGENCY DEPARTMENT COURSE and DIFFERENTIAL DIAGNOSIS/MDM:   Vitals:    Vitals:    04/18/22 1658 04/18/22 1700   BP: 135/71    Pulse: 67    Resp: 20    Temp:  97.7 °F (36.5 °C)   TempSrc:  Temporal   SpO2: 97%    Weight: 180 lb (81.6 kg)    Height: 5' 2\" (1.575 m)            [facility]    CRITICAL CARE TIME   Total Critical Care time was  minutes, excluding separately reportableprocedures. There was a high probability of clinicallysignificant/life threatening deterioration in the patient's condition which required my urgent intervention. ONSULTS:  None    PROCEDURES:  Unless otherwise noted below, none     Procedures    FINAL IMPRESSION      1. Nausea and vomiting, intractability of vomiting not specified, unspecified vomiting type    2. Right leg pain    3.  Strain of lumbar region, initial encounter          DISPOSITION/PLAN   DISPOSITION        PATIENT REFERRED TO:  Eric Mondragon DO  [address] [place] [address]  [phone]    In 2 days  If symptoms worsen      DISCHARGE MEDICATIONS:  New Prescriptions    NAPROXEN (NAPROSYN) 375 MG TABLET    Take 1 tablet by mouth 2 times daily (with meals)          (Please note that portions of this note were completed with a voice recognition program.  Efforts were made to edit the dictations but occasionally words are mis-transcribed.)    Fannie Munson MD (electronically signed)  Attending Emergency Physician       Fannie Munson MD  04/18/22 [id number]

## 2022-04-18 NOTE — ED NOTES
Nursing Adult Assessment    General Appearance  [x] Facial Expressions, extremities, & body posture are relaxed. [] Exceptions:    Cognitive  [x] Alert, make eye contact when prompted. [x] Oriented to person, place, & situation. [] Exceptions:    Respiratory  [x] Unlabored breathing   [x] Speaks in clear and complete sentences   [x] Chest expansion is symmetrical with breaths   [x] Breath sounds are clear bilaterally. [] Exceptions:    Cardiovascular  [x] Regular apical heart sounds   [x] Peripheral pulses are palpable   [x] Capillary refill < 3 seconds in all extremities. [] Exceptions:    Abdomen  [x] Non-tender   [x] Non-distended   [x] Bowel sounds are present. [x] Exceptions: nausea    Skin  [x] Color appropriate for ethnicity   [x] No rash or discoloration present at the area(s) of complaint   [x] Warm and dry to touch. [] Exceptions:    Extremities  [x] Non-tender   [x] Normal range of motion   [x] Normal sensation   [x] Normal Appearance, No Edema.   [] Exceptions:      Praksah Antonio RN  04/18/22 [facility] Johan Stout RN  04/18/22 [id number]

## 2022-12-04 ENCOUNTER — HOSPITAL ENCOUNTER (EMERGENCY)
Age: 22
Discharge: HOME | End: 2022-12-04
Payer: COMMERCIAL

## 2022-12-04 VITALS
SYSTOLIC BLOOD PRESSURE: 130 MMHG | HEIGHT: 62 IN | DIASTOLIC BLOOD PRESSURE: 86 MMHG | TEMPERATURE: 98.3 F | WEIGHT: 160 LBS | BODY MASS INDEX: 29.44 KG/M2 | RESPIRATION RATE: 16 BRPM | HEART RATE: 106 BPM | OXYGEN SATURATION: 99 %

## 2022-12-04 DIAGNOSIS — J06.9 VIRAL URI: Primary | ICD-10-CM

## 2022-12-04 LAB
INFLUENZA A BY PCR: NEGATIVE
INFLUENZA B BY PCR: NEGATIVE
SARS-COV-2, NAAT: NOT DETECTED

## 2022-12-04 PROCEDURE — 87635 SARS-COV-2 COVID-19 AMP PRB: CPT

## 2022-12-04 PROCEDURE — 99283 EMERGENCY DEPT VISIT LOW MDM: CPT

## 2022-12-04 PROCEDURE — 6370000000 HC RX 637 (ALT 250 FOR IP)

## 2022-12-04 PROCEDURE — 87502 INFLUENZA DNA AMP PROBE: CPT

## 2022-12-04 RX ORDER — ONDANSETRON 4 MG/1
4 TABLET, ORALLY DISINTEGRATING ORAL EVERY 8 HOURS PRN
Qty: 12 TABLET | Refills: 0 | Status: SHIPPED | OUTPATIENT
Start: 2022-12-04

## 2022-12-04 RX ORDER — IBUPROFEN 600 MG/1
600 TABLET ORAL ONCE
Status: COMPLETED | OUTPATIENT
Start: 2022-12-04 | End: 2022-12-04

## 2022-12-04 RX ORDER — IBUPROFEN 600 MG/1
600 TABLET ORAL EVERY 8 HOURS PRN
Qty: 30 TABLET | Refills: 0 | Status: SHIPPED | OUTPATIENT
Start: 2022-12-04

## 2022-12-04 RX ADMIN — IBUPROFEN 600 MG: 600 TABLET, FILM COATED ORAL at 19:10

## 2022-12-04 ASSESSMENT — PAIN DESCRIPTION - FREQUENCY: FREQUENCY: CONTINUOUS

## 2022-12-04 ASSESSMENT — ENCOUNTER SYMPTOMS
SORE THROAT: 0
BACK PAIN: 0
ABDOMINAL PAIN: 0
COUGH: 0
DIARRHEA: 0
SHORTNESS OF BREATH: 0
VOMITING: 0
NAUSEA: 0

## 2022-12-04 ASSESSMENT — PAIN SCALES - GENERAL: PAINLEVEL_OUTOF10: 4

## 2022-12-04 ASSESSMENT — PAIN DESCRIPTION - LOCATION: LOCATION: HEAD

## 2022-12-04 ASSESSMENT — PAIN DESCRIPTION - PAIN TYPE: TYPE: ACUTE PAIN

## 2022-12-04 ASSESSMENT — PAIN - FUNCTIONAL ASSESSMENT: PAIN_FUNCTIONAL_ASSESSMENT: 0-10

## 2022-12-04 NOTE — Clinical Note
Maribel Benjamin was seen and treated in our emergency department on 12/4/2022. She may return to work on 12/06/2022. If you have any questions or concerns, please don't hesitate to call.       Husam Brown, APRN - CNP

## 2022-12-04 NOTE — ED PROVIDER NOTES
[facility] ED  eMERGENCYdEPARTMENT eNCOUnter      Pt Name: Mayra Delacruz  MRN: [id number]  [place] 2000of evaluation: 12/4/2022  JOHN Vallecillo - CNP    CHIEF COMPLAINT       Chief Complaint   Patient presents with    Fatigue    Headache         HISTORY OF PRESENT ILLNESS  (Location/Symptom, Timing/Onset, Context/Setting, Quality, Duration, Modifying Factors, Severity.)   Mayra Delacruz is a 23 y.o. female no significant medical hx who presents to the emergency department for headache and fatigue. Patient complains of headache, fatigue, body aches x 2 days. She has not taken anything for her pain today. She also has had some congestion. She has had sick contact exposure. Body aches and head ache 4/10. Denies any CP, SOB, abdominal pain, nausea, emesis. HPI    Nursing Notes were reviewed and I agree. REVIEW OF SYSTEMS    (2-9 systems for level 4, 10 or more for level 5)     Review of Systems   Constitutional:  Negative for activity change, chills and fever. HENT:  Positive for congestion. Negative for ear pain and sore throat. Eyes:  Negative for visual disturbance. Respiratory:  Negative for cough and shortness of breath. Cardiovascular:  Negative for chest pain, palpitations and leg swelling. Gastrointestinal:  Negative for abdominal pain, diarrhea, nausea and vomiting. Genitourinary:  Negative for dysuria. Musculoskeletal:  Positive for myalgias. Negative for back pain. Skin:  Negative for rash. Neurological:  Positive for headaches. Negative for dizziness and weakness. as noted above the remainder of the review of systems was reviewed and negative.        PAST MEDICAL HISTORY     Past Medical History:   Diagnosis Date    Cannabis abuse     Eustachian tube dysfunction     Otitis media     URI (upper respiratory infection)     Verruca     right distal phalanax         SURGICAL HISTORY       Past Surgical History:   Procedure Laterality Date ANKLE FRACTURE SURGERY Right 2/3/2021    RIGHT ANKLE OPEN REDUCTION INTERNAL FIXATION, I & D OPEN FRACTURE performed by Kenji Beth MD at [address] [place] [address]       Previous Medications    DICYCLOMINE (BENTYL) 10 MG CAPSULE    Take 1 capsule by mouth every 6 hours as needed (cramps)    HANDICAP PLACARD MISC    by Does not apply route 3 months    KETOROLAC (TORADOL) 10 MG TABLET    Take 1 tablet by mouth every 6 hours as needed for Pain    NAPROXEN (NAPROSYN) 375 MG TABLET    Take 1 tablet by mouth 2 times daily (with meals)    ONDANSETRON (ZOFRAN) 4 MG TABLET    Take 1 tablet by mouth every 8 hours as needed for Nausea    OXYCODONE-ACETAMINOPHEN (PERCOCET) 5-325 MG PER TABLET    Take 1 tablet by mouth every 6 hours as needed for Pain. ALLERGIES     Patient has no known allergies. HISTORY       Family History   Problem Relation Age of Onset    Stroke Father     Heart Disease Father          at age 44 Afib, blood clot          SOCIAL HISTORY       Social History     Socioeconomic History    Marital status: Single   Tobacco Use    Smoking status: Every Day     Packs/day: 0.50     Years: 5.00     Pack years: 2.50     Types: Cigarettes, E-Cigarettes    Smokeless tobacco: Never   Vaping Use    Vaping Use: Never used   Substance and Sexual Activity    Alcohol use: No    Drug use: Yes     Types: Marijuana (Weed)    Sexual activity: Yes     Partners: Male       SCREENINGS    [place] Coma Scale  Eye Opening: Spontaneous  Best Verbal Response: Oriented  Best Motor Response: Obeys commands  [place] Coma Scale Score: 15      PHYSICAL EXAM    (up to 7 forlevel 4, 8 or more for level 5)     ED Triage Vitals   BP Temp Temp src Pulse Resp SpO2 Height Weight   -- -- -- -- -- -- -- --       Physical Exam  Vitals and nursing note reviewed. Constitutional:       General: She is not in acute distress. Appearance: She is well-developed. She is not ill-appearing.    HENT:      Head: Normocephalic and atraumatic. Right Ear: Tympanic membrane, ear canal and external ear normal.      Left Ear: Tympanic membrane, ear canal and external ear normal.      Nose: Congestion present. Mouth/Throat:      Mouth: Mucous membranes are moist.      Pharynx: No oropharyngeal exudate or posterior oropharyngeal erythema. Eyes:      Conjunctiva/sclera: Conjunctivae normal.      Pupils: Pupils are equal, round, and reactive to light. Cardiovascular:      Rate and Rhythm: Normal rate and regular rhythm. Pulses: Normal pulses. Heart sounds: Normal heart sounds. No murmur heard. No friction rub. Pulmonary:      Effort: Pulmonary effort is normal.      Breath sounds: Normal breath sounds. No wheezing or rhonchi. Abdominal:      General: Bowel sounds are normal. There is no distension. Palpations: Abdomen is soft. Tenderness: There is no abdominal tenderness. Musculoskeletal:         General: Normal range of motion. Cervical back: Normal range of motion and neck supple. Skin:     General: Skin is warm and dry. Capillary Refill: Capillary refill takes less than 2 seconds. Neurological:      General: No focal deficit present. Mental Status: She is alert and oriented to person, place, and time. Mental status is at baseline. Psychiatric:         Mood and Affect: Mood normal.         Behavior: Behavior normal.         Thought Content:  Thought content normal.         Judgment: Judgment normal.         DIAGNOSTIC RESULTS     RADIOLOGY:   Non-plain film images such as CT, Ultrasound and MRI are read by the radiologist. Plain radiographic images are visualized and preliminarilyinterpreted by JOHN Calix CNP with the below findings:        Interpretation per the Radiologist below, if available at the time of this note:    No orders to display       LABS:  Labs Reviewed   RAPID INFLUENZA A/B ANTIGENS   COVID-19, RAPID       All other labs were within normal range or not returnedas of this dictation. EMERGENCYDEPARTMENT COURSE and DIFFERENTIAL DIAGNOSIS/MDM:   Vitals:    Vitals:    12/04/22 1847 12/04/22 1923   BP: 124/83 130/86   Pulse: (!) 129 (!) 106   Resp: 18 16   Temp: 98.3 °F (36.8 °C)    TempSrc: Temporal    SpO2: 96% 99%   Weight: 160 lb (72.6 kg)    Height: 5' 2\" (1.575 m)        REASSESSMENT            MDM  AO 23year old female presents to ED for headache and fatigue. Patient afebrile and hemodynamically stable. Tolerating oral fluids. Medicated with Motrin po. Rapid flu negative. Rapid COVID negative. Pain improved post medication. Suspect viral URI. Rx. Motrin and Zofran given. Discussed Dx, Tx, Rx, follow up ,reasons to return to ED for further treatment patient states understanding and denies any questions. PROCEDURES:    Procedures      FINAL IMPRESSION      1.  Viral URI Stable         DISPOSITION/PLAN   DISPOSITION Discharge - Pending Orders Complete 12/04/2022 07:16:40 PM      PATIENT REFERRED TO:  Isatu Jimenez DO  [address] [place] [address]  [phone]    In 2 days      [facility] ED  [address] [facility]  [phone]    If symptoms worsen    DISCHARGE MEDICATIONS:  New Prescriptions    IBUPROFEN (IBU) 600 MG TABLET    Take 1 tablet by mouth every 8 hours as needed for Pain or Fever    ONDANSETRON (ZOFRAN-ODT) 4 MG DISINTEGRATING TABLET    Take 1 tablet by mouth every 8 hours as needed for Nausea or Vomiting       (Please note that portions of this note were completed with a voice recognition program.  Efforts were made to edit the dictations but occasionally words are mis-transcribed.)    JOHN Kwok CNP, APRN - CNP  12/04/22 1925

## 2022-12-05 NOTE — ED NOTES
Pt discharged per physician order, Pt denies questions at this time. Pt ambulated to lobby with steady gait. No distress noted. Follow up appointment, and prescriptions discussed with patient.        Tristian Mejia RN  12/04/22 [id number]

## 2024-04-29 ENCOUNTER — OFFICE VISIT (OUTPATIENT)
Dept: OBSTETRICS AND GYNECOLOGY | Facility: CLINIC | Age: 24
End: 2024-04-29
Payer: COMMERCIAL

## 2024-04-29 VITALS — SYSTOLIC BLOOD PRESSURE: 114 MMHG | DIASTOLIC BLOOD PRESSURE: 64 MMHG | WEIGHT: 158 LBS | BODY MASS INDEX: 27.99 KG/M2

## 2024-04-29 DIAGNOSIS — Z11.3 SCREENING FOR STD (SEXUALLY TRANSMITTED DISEASE): ICD-10-CM

## 2024-04-29 DIAGNOSIS — N91.2 AMENORRHEA: ICD-10-CM

## 2024-04-29 DIAGNOSIS — Z20.2 EXPOSURE TO TRICHOMONAS: ICD-10-CM

## 2024-04-29 DIAGNOSIS — N91.4 SECONDARY OLIGOMENORRHEA: Primary | ICD-10-CM

## 2024-04-29 LAB — PREGNANCY TEST URINE, POC: NEGATIVE

## 2024-04-29 PROCEDURE — 81025 URINE PREGNANCY TEST: CPT | Performed by: OBSTETRICS & GYNECOLOGY

## 2024-04-29 PROCEDURE — 87661 TRICHOMONAS VAGINALIS AMPLIF: CPT

## 2024-04-29 PROCEDURE — 87800 DETECT AGNT MULT DNA DIREC: CPT

## 2024-04-29 PROCEDURE — 99204 OFFICE O/P NEW MOD 45 MIN: CPT | Performed by: OBSTETRICS & GYNECOLOGY

## 2024-04-29 RX ORDER — BUSPIRONE HYDROCHLORIDE 5 MG/1
5 TABLET ORAL 2 TIMES DAILY
Qty: 60 TABLET | Refills: 11 | Status: SHIPPED | OUTPATIENT
Start: 2024-04-29 | End: 2025-04-29

## 2024-04-29 RX ORDER — NORETHINDRONE ACETATE AND ETHINYL ESTRADIOL .02; 1 MG/1; MG/1
1 TABLET ORAL DAILY
Qty: 90 TABLET | Refills: 3 | Status: SHIPPED | OUTPATIENT
Start: 2024-04-29 | End: 2025-04-29

## 2024-04-29 ASSESSMENT — PAIN SCALES - GENERAL: PAINLEVEL: 0-NO PAIN

## 2024-04-29 ASSESSMENT — PATIENT HEALTH QUESTIONNAIRE - PHQ9
2. FEELING DOWN, DEPRESSED OR HOPELESS: NOT AT ALL
SUM OF ALL RESPONSES TO PHQ9 QUESTIONS 1 & 2: 0
1. LITTLE INTEREST OR PLEASURE IN DOING THINGS: NOT AT ALL

## 2024-04-29 NOTE — PROGRESS NOTES
GYN PROGRESS NOTE          Chief complaint: Oligomenorrhea    HPI:  Patient answers are not available for this visit.  HPI    Abena is a NEW patient here today to discuss amenorrhea.  She took a home UPT and it was negative one month ago.  She has not had a cycle since early January.  She previously had monthly cycles.  She is not using any form of birth control.  She is sexually active with a monogamous partner.  She accepts STI genital screening today.  Chaperone: NATHAN Leyva    Last edited by Angelita Sanchez RN on 4/29/2024  2:48 PM.          Reviewed case with patient, reviewed plans.    Reports not getting pregnant with unprotected intercourse    Discuss oligo ovulation    Discussed birth control options    No weight changes no hair changes no other systemic symptoms    ROS:  GEN - no fevers or chills  RESP - no SOB or cough  GYN - see HPI      HISTORY:  Past Medical History:   Diagnosis Date    Personal history of other diseases of the respiratory system 02/27/2020    History of sore throat    Personal history of other diseases of urinary system 11/29/2020    History of pyelonephritis    Personal history of other infectious and parasitic diseases 11/23/2020    History of viral infection    Personal history of other specified conditions 11/23/2020    History of fever     Past Surgical History:   Procedure Laterality Date    CT AORTA AND BILATERAL ILIOFEMORAL RUNOFF ANGIOGRAM W AND/OR WO IV CONTRAST  11/6/2021    CT AORTA AND BILATERAL ILIOFEMORAL RUNOFF ANGIOGRAM W AND/OR WO IV CONTRAST 11/6/2021 [facility] CLINICAL LEGACY    OTHER SURGICAL HISTORY  11/23/2020    No history of surgery     Social History     Socioeconomic History    Marital status: Single     Spouse name: Not on file    Number of children: Not on file    Years of education: Not on file    Highest education level: Not on file   Occupational History    Not on file   Tobacco Use    Smoking status: Every Day     Current packs/day: 1.00     Types:  Cigarettes    Smokeless tobacco: Never   Vaping Use    Vaping status: Every Day    Substances: Nicotine, THC, Flavoring   Substance and Sexual Activity    Alcohol use: Not on file    Drug use: Not on file    Sexual activity: Not on file   Other Topics Concern    Not on file   Social History Narrative    Not on file     Social Determinants of Health     Financial Resource Strain: Not on file   Food Insecurity: Not on file   Transportation Needs: Not on file   Physical Activity: Not on file   Stress: Not on file   Social Connections: Not on file   Intimate Partner Violence: Not on file   Housing Stability: Not on file     Cancer-related family history is not on file.       PHYSICAL EXAM:  /64 (BP Location: Left arm, Patient Position: Sitting, BP Cuff Size: Adult)   Wt 71.7 kg (158 lb)   LMP 01/01/2024 (Approximate)   BMI 27.99 kg/m²   GEN:  A&O, NAD  HEENT:  head HC/AT, no visible goiter  PSYCH:  normal affect, non-anxious      IMPRESSION/PLAN:    23-year-old oligoovulatory     Follow-up 1 month review labs   consider doing Pap at that time  New start OCPs continuous versus cyclic use        Syed Fraser MD

## 2024-04-30 LAB
C TRACH RRNA SPEC QL NAA+PROBE: NEGATIVE
N GONORRHOEA DNA SPEC QL PROBE+SIG AMP: NEGATIVE
T VAGINALIS RRNA SPEC QL NAA+PROBE: POSITIVE

## 2024-05-01 RX ORDER — METRONIDAZOLE 500 MG/1
500 TABLET ORAL 2 TIMES DAILY
Qty: 14 TABLET | Refills: 1 | Status: SHIPPED | OUTPATIENT
Start: 2024-05-01 | End: 2024-05-08

## 2024-05-06 ENCOUNTER — TELEPHONE (OUTPATIENT)
Dept: OBSTETRICS AND GYNECOLOGY | Facility: CLINIC | Age: 24
End: 2024-05-06
Payer: COMMERCIAL

## 2024-05-06 NOTE — TELEPHONE ENCOUNTER
----- Message from Syed Fraser MD sent at 5/1/2024  8:42 AM EDT -----  Probe positive for trichomonas, Meds Sent    Partner therapy on refill if desired

## 2024-09-18 ENCOUNTER — APPOINTMENT (OUTPATIENT)
Dept: CT IMAGING | Age: 24
End: 2024-09-18

## 2024-09-18 ENCOUNTER — HOSPITAL ENCOUNTER (EMERGENCY)
Age: 24
Discharge: HOME | End: 2024-09-18
Attending: STUDENT IN AN ORGANIZED HEALTH CARE EDUCATION/TRAINING PROGRAM

## 2024-09-18 VITALS
RESPIRATION RATE: 15 BRPM | SYSTOLIC BLOOD PRESSURE: 111 MMHG | TEMPERATURE: 98.4 F | BODY MASS INDEX: 25.61 KG/M2 | DIASTOLIC BLOOD PRESSURE: 71 MMHG | OXYGEN SATURATION: 97 % | HEART RATE: 65 BPM | WEIGHT: 140 LBS

## 2024-09-18 DIAGNOSIS — R10.30 LOWER ABDOMINAL PAIN: Primary | ICD-10-CM

## 2024-09-18 DIAGNOSIS — R11.2 NAUSEA AND VOMITING, UNSPECIFIED VOMITING TYPE: ICD-10-CM

## 2024-09-18 LAB
ALBUMIN SERPL-MCNC: 4.5 G/DL (ref 3.5–4.6)
ALP SERPL-CCNC: 77 U/L (ref 40–130)
ALT SERPL-CCNC: 14 U/L (ref 0–33)
ANION GAP SERPL CALCULATED.3IONS-SCNC: 14 MEQ/L (ref 9–15)
AST SERPL-CCNC: 18 U/L (ref 0–35)
BASOPHILS # BLD: 0 K/UL (ref 0–0.2)
BASOPHILS NFR BLD: 0.2 %
BILIRUB SERPL-MCNC: 0.3 MG/DL (ref 0.2–0.7)
BUN SERPL-MCNC: 15 MG/DL (ref 6–20)
CALCIUM SERPL-MCNC: 9.9 MG/DL (ref 8.5–9.9)
CHLORIDE SERPL-SCNC: 100 MEQ/L (ref 95–107)
CO2 SERPL-SCNC: 24 MEQ/L (ref 20–31)
CREAT SERPL-MCNC: 0.87 MG/DL (ref 0.5–0.9)
EOSINOPHIL # BLD: 0 K/UL (ref 0–0.7)
EOSINOPHIL NFR BLD: 0 %
ERYTHROCYTE [DISTWIDTH] IN BLOOD BY AUTOMATED COUNT: 12.6 % (ref 11.5–14.5)
GLOBULIN SER CALC-MCNC: 3.4 G/DL (ref 2.3–3.5)
GLUCOSE SERPL-MCNC: 143 MG/DL (ref 70–99)
HCG, URINE, POC: NEGATIVE
HCT VFR BLD AUTO: 43 % (ref 37–47)
HGB BLD-MCNC: 15.1 G/DL (ref 12–16)
LACTATE BLDV-SCNC: 2.6 MMOL/L (ref 0.5–2.2)
LIPASE SERPL-CCNC: 14 U/L (ref 12–95)
LYMPHOCYTES # BLD: 1.3 K/UL (ref 1–4.8)
LYMPHOCYTES NFR BLD: 9.8 %
Lab: NORMAL
MCH RBC QN AUTO: 31.5 PG (ref 27–31.3)
MCHC RBC AUTO-ENTMCNC: 35.1 % (ref 33–37)
MCV RBC AUTO: 89.8 FL (ref 79.4–94.8)
MONOCYTES # BLD: 0.3 K/UL (ref 0.2–0.8)
MONOCYTES NFR BLD: 2.4 %
NEGATIVE QC PASS/FAIL: NORMAL
NEUTROPHILS # BLD: 11.5 K/UL (ref 1.4–6.5)
NEUTS SEG NFR BLD: 86.9 %
PLATELET # BLD AUTO: 384 K/UL (ref 130–400)
POSITIVE QC PASS/FAIL: NORMAL
POTASSIUM SERPL-SCNC: 3.6 MEQ/L (ref 3.4–4.9)
PROT SERPL-MCNC: 7.9 G/DL (ref 6.3–8)
RBC # BLD AUTO: 4.79 M/UL (ref 4.2–5.4)
SODIUM SERPL-SCNC: 138 MEQ/L (ref 135–144)
WBC # BLD AUTO: 13.2 K/UL (ref 4.8–10.8)

## 2024-09-18 PROCEDURE — 83690 ASSAY OF LIPASE: CPT

## 2024-09-18 PROCEDURE — 85025 COMPLETE CBC W/AUTO DIFF WBC: CPT

## 2024-09-18 PROCEDURE — 83605 ASSAY OF LACTIC ACID: CPT

## 2024-09-18 PROCEDURE — 2580000003 HC RX 258: Performed by: STUDENT IN AN ORGANIZED HEALTH CARE EDUCATION/TRAINING PROGRAM

## 2024-09-18 PROCEDURE — 99285 EMERGENCY DEPT VISIT HI MDM: CPT

## 2024-09-18 PROCEDURE — 74177 CT ABD & PELVIS W/CONTRAST: CPT

## 2024-09-18 PROCEDURE — 96374 THER/PROPH/DIAG INJ IV PUSH: CPT

## 2024-09-18 PROCEDURE — 80053 COMPREHEN METABOLIC PANEL: CPT

## 2024-09-18 PROCEDURE — 6360000002 HC RX W HCPCS: Performed by: STUDENT IN AN ORGANIZED HEALTH CARE EDUCATION/TRAINING PROGRAM

## 2024-09-18 PROCEDURE — 93005 ELECTROCARDIOGRAM TRACING: CPT | Performed by: STUDENT IN AN ORGANIZED HEALTH CARE EDUCATION/TRAINING PROGRAM

## 2024-09-18 PROCEDURE — 6360000004 HC RX CONTRAST MEDICATION: Performed by: STUDENT IN AN ORGANIZED HEALTH CARE EDUCATION/TRAINING PROGRAM

## 2024-09-18 RX ORDER — 0.9 % SODIUM CHLORIDE 0.9 %
1000 INTRAVENOUS SOLUTION INTRAVENOUS ONCE
Status: COMPLETED | OUTPATIENT
Start: 2024-09-18 | End: 2024-09-18

## 2024-09-18 RX ORDER — IOPAMIDOL 755 MG/ML
75 INJECTION, SOLUTION INTRAVASCULAR
Status: COMPLETED | OUTPATIENT
Start: 2024-09-18 | End: 2024-09-18

## 2024-09-18 RX ORDER — DROPERIDOL 2.5 MG/ML
2.5 INJECTION, SOLUTION INTRAMUSCULAR; INTRAVENOUS EVERY 6 HOURS PRN
Status: DISCONTINUED | OUTPATIENT
Start: 2024-09-18 | End: 2024-09-18 | Stop reason: HOSPADM

## 2024-09-18 RX ORDER — NAPROXEN 500 MG/1
500 TABLET ORAL 2 TIMES DAILY PRN
Qty: 60 TABLET | Refills: 3 | Status: SHIPPED | OUTPATIENT
Start: 2024-09-18 | End: 2024-09-18

## 2024-09-18 RX ORDER — ONDANSETRON 4 MG/1
4 TABLET, FILM COATED ORAL EVERY 8 HOURS PRN
Qty: 20 TABLET | Refills: 0 | Status: SHIPPED | OUTPATIENT
Start: 2024-09-18 | End: 2024-09-18

## 2024-09-18 RX ORDER — ONDANSETRON 4 MG/1
4 TABLET, FILM COATED ORAL EVERY 8 HOURS PRN
Qty: 20 TABLET | Refills: 0 | Status: SHIPPED | OUTPATIENT
Start: 2024-09-18

## 2024-09-18 RX ORDER — NAPROXEN 500 MG/1
500 TABLET ORAL 2 TIMES DAILY PRN
Qty: 60 TABLET | Refills: 3 | Status: SHIPPED | OUTPATIENT
Start: 2024-09-18

## 2024-09-18 RX ADMIN — DROPERIDOL 2.5 MG: 2.5 INJECTION, SOLUTION INTRAMUSCULAR; INTRAVENOUS at 18:51

## 2024-09-18 RX ADMIN — IOPAMIDOL 75 ML: 755 INJECTION, SOLUTION INTRAVENOUS at 19:28

## 2024-09-18 RX ADMIN — SODIUM CHLORIDE 1000 ML: 9 INJECTION, SOLUTION INTRAVENOUS at 18:06

## 2024-09-18 ASSESSMENT — PAIN DESCRIPTION - PAIN TYPE: TYPE: ACUTE PAIN

## 2024-09-18 ASSESSMENT — LIFESTYLE VARIABLES
HOW MANY STANDARD DRINKS CONTAINING ALCOHOL DO YOU HAVE ON A TYPICAL DAY: PATIENT DOES NOT DRINK
HOW OFTEN DO YOU HAVE A DRINK CONTAINING ALCOHOL: NEVER

## 2024-09-18 ASSESSMENT — PAIN DESCRIPTION - LOCATION: LOCATION: GENERALIZED

## 2024-09-18 ASSESSMENT — PAIN - FUNCTIONAL ASSESSMENT
PAIN_FUNCTIONAL_ASSESSMENT: PREVENTS OR INTERFERES SOME ACTIVE ACTIVITIES AND ADLS
PAIN_FUNCTIONAL_ASSESSMENT: 0-10

## 2024-09-18 ASSESSMENT — PAIN DESCRIPTION - DESCRIPTORS: DESCRIPTORS: ACHING

## 2024-09-18 ASSESSMENT — PAIN DESCRIPTION - ONSET: ONSET: ON-GOING

## 2024-09-18 ASSESSMENT — PAIN SCALES - GENERAL: PAINLEVEL_OUTOF10: 5

## 2024-09-18 ASSESSMENT — PAIN DESCRIPTION - FREQUENCY: FREQUENCY: INTERMITTENT

## 2024-09-19 LAB
EKG ATRIAL RATE: 64 BPM
EKG P AXIS: 73 DEGREES
EKG P-R INTERVAL: 144 MS
EKG Q-T INTERVAL: 460 MS
EKG QRS DURATION: 82 MS
EKG QTC CALCULATION (BAZETT): 474 MS
EKG R AXIS: 43 DEGREES
EKG T AXIS: 23 DEGREES
EKG VENTRICULAR RATE: 64 BPM
PERFORMED ON: NORMAL
POC CREATININE: 0.9 MG/DL (ref 0.6–1.2)
POC SAMPLE TYPE: NORMAL

## 2024-09-20 ASSESSMENT — ENCOUNTER SYMPTOMS
EYE ITCHING: 0
EYE PAIN: 0
CHEST TIGHTNESS: 0
EYE DISCHARGE: 0
CONSTIPATION: 0
COUGH: 0
SINUS PRESSURE: 0
COLOR CHANGE: 0
BACK PAIN: 0
PHOTOPHOBIA: 0
NAUSEA: 1
ABDOMINAL DISTENTION: 0
VOMITING: 1
DIARRHEA: 0
RHINORRHEA: 0
WHEEZING: 0
SINUS PAIN: 0
EYE REDNESS: 0

## 2025-01-28 ENCOUNTER — APPOINTMENT (OUTPATIENT)
Dept: RADIOLOGY | Facility: HOSPITAL | Age: 25
End: 2025-01-28
Payer: COMMERCIAL

## 2025-01-28 ENCOUNTER — HOSPITAL ENCOUNTER (EMERGENCY)
Facility: HOSPITAL | Age: 25
Discharge: HOME | End: 2025-01-28
Attending: EMERGENCY MEDICINE
Payer: COMMERCIAL

## 2025-01-28 ENCOUNTER — HOSPITAL ENCOUNTER (EMERGENCY)
Dept: CARDIOLOGY | Facility: HOSPITAL | Age: 25
Discharge: HOME | End: 2025-01-28
Payer: COMMERCIAL

## 2025-01-28 VITALS
WEIGHT: 150 LBS | OXYGEN SATURATION: 99 % | HEART RATE: 63 BPM | SYSTOLIC BLOOD PRESSURE: 143 MMHG | HEIGHT: 62 IN | DIASTOLIC BLOOD PRESSURE: 66 MMHG | TEMPERATURE: 98.1 F | RESPIRATION RATE: 18 BRPM | BODY MASS INDEX: 27.6 KG/M2

## 2025-01-28 DIAGNOSIS — R11.2 NAUSEA AND VOMITING, UNSPECIFIED VOMITING TYPE: Primary | ICD-10-CM

## 2025-01-28 DIAGNOSIS — R07.9 CHEST PAIN, UNSPECIFIED TYPE: ICD-10-CM

## 2025-01-28 DIAGNOSIS — F19.10 SUBSTANCE ABUSE (MULTI): ICD-10-CM

## 2025-01-28 LAB
ALBUMIN SERPL BCP-MCNC: 5.6 G/DL (ref 3.4–5)
ALP SERPL-CCNC: 65 U/L (ref 33–110)
ALT SERPL W P-5'-P-CCNC: 13 U/L (ref 7–45)
AMPHETAMINES UR QL SCN: ABNORMAL
ANION GAP SERPL CALC-SCNC: 19 MMOL/L (ref 10–20)
APAP SERPL-MCNC: <10 UG/ML
APPEARANCE UR: ABNORMAL
AST SERPL W P-5'-P-CCNC: 32 U/L (ref 9–39)
B-HCG SERPL-ACNC: <2 MIU/ML
BARBITURATES UR QL SCN: ABNORMAL
BASOPHILS # BLD AUTO: 0.03 X10*3/UL (ref 0–0.1)
BASOPHILS NFR BLD AUTO: 0.2 %
BENZODIAZ UR QL SCN: ABNORMAL
BILIRUB SERPL-MCNC: 0.6 MG/DL (ref 0–1.2)
BILIRUB UR STRIP.AUTO-MCNC: NEGATIVE MG/DL
BUN SERPL-MCNC: 20 MG/DL (ref 6–23)
BZE UR QL SCN: ABNORMAL
CALCIUM SERPL-MCNC: 10 MG/DL (ref 8.6–10.3)
CANNABINOIDS UR QL SCN: ABNORMAL
CARDIAC TROPONIN I PNL SERPL HS: 3 NG/L (ref 0–13)
CARDIAC TROPONIN I PNL SERPL HS: 4 NG/L (ref 0–13)
CHLORIDE SERPL-SCNC: 96 MMOL/L (ref 98–107)
CO2 SERPL-SCNC: 27 MMOL/L (ref 21–32)
COLOR UR: YELLOW
CREAT SERPL-MCNC: 0.84 MG/DL (ref 0.5–1.05)
EGFRCR SERPLBLD CKD-EPI 2021: >90 ML/MIN/1.73M*2
EOSINOPHIL # BLD AUTO: 0 X10*3/UL (ref 0–0.7)
EOSINOPHIL NFR BLD AUTO: 0 %
ERYTHROCYTE [DISTWIDTH] IN BLOOD BY AUTOMATED COUNT: 12.2 % (ref 11.5–14.5)
ETHANOL SERPL-MCNC: <10 MG/DL
FENTANYL+NORFENTANYL UR QL SCN: ABNORMAL
FLUAV RNA RESP QL NAA+PROBE: NOT DETECTED
FLUBV RNA RESP QL NAA+PROBE: NOT DETECTED
GLUCOSE SERPL-MCNC: 111 MG/DL (ref 74–99)
GLUCOSE UR STRIP.AUTO-MCNC: NORMAL MG/DL
HCT VFR BLD AUTO: 42.4 % (ref 36–46)
HGB BLD-MCNC: 15.4 G/DL (ref 12–16)
IMM GRANULOCYTES # BLD AUTO: 0.07 X10*3/UL (ref 0–0.7)
IMM GRANULOCYTES NFR BLD AUTO: 0.5 % (ref 0–0.9)
KETONES UR STRIP.AUTO-MCNC: ABNORMAL MG/DL
LEUKOCYTE ESTERASE UR QL STRIP.AUTO: NEGATIVE
LIPASE SERPL-CCNC: 11 U/L (ref 9–82)
LYMPHOCYTES # BLD AUTO: 1.54 X10*3/UL (ref 1.2–4.8)
LYMPHOCYTES NFR BLD AUTO: 10 %
MCH RBC QN AUTO: 32 PG (ref 26–34)
MCHC RBC AUTO-ENTMCNC: 36.3 G/DL (ref 32–36)
MCV RBC AUTO: 88 FL (ref 80–100)
METHADONE UR QL SCN: ABNORMAL
MONOCYTES # BLD AUTO: 0.54 X10*3/UL (ref 0.1–1)
MONOCYTES NFR BLD AUTO: 3.5 %
MUCOUS THREADS #/AREA URNS AUTO: NORMAL /LPF
NEUTROPHILS # BLD AUTO: 13.22 X10*3/UL (ref 1.2–7.7)
NEUTROPHILS NFR BLD AUTO: 85.8 %
NITRITE UR QL STRIP.AUTO: NEGATIVE
NRBC BLD-RTO: 0 /100 WBCS (ref 0–0)
OPIATES UR QL SCN: ABNORMAL
OXYCODONE+OXYMORPHONE UR QL SCN: ABNORMAL
PCP UR QL SCN: ABNORMAL
PH UR STRIP.AUTO: 5.5 [PH]
PLATELET # BLD AUTO: 349 X10*3/UL (ref 150–450)
POTASSIUM SERPL-SCNC: 3 MMOL/L (ref 3.5–5.3)
POTASSIUM SERPL-SCNC: 5.5 MMOL/L (ref 3.5–5.3)
PROT SERPL-MCNC: 8.8 G/DL (ref 6.4–8.2)
PROT UR STRIP.AUTO-MCNC: ABNORMAL MG/DL
RBC # BLD AUTO: 4.82 X10*6/UL (ref 4–5.2)
RBC # UR STRIP.AUTO: NEGATIVE /UL
RBC #/AREA URNS AUTO: NORMAL /HPF
SALICYLATES SERPL-MCNC: <3 MG/DL
SARS-COV-2 RNA RESP QL NAA+PROBE: NOT DETECTED
SODIUM SERPL-SCNC: 136 MMOL/L (ref 136–145)
SP GR UR STRIP.AUTO: 1.04
SQUAMOUS #/AREA URNS AUTO: NORMAL /HPF
UROBILINOGEN UR STRIP.AUTO-MCNC: NORMAL MG/DL
WBC # BLD AUTO: 15.4 X10*3/UL (ref 4.4–11.3)
WBC #/AREA URNS AUTO: NORMAL /HPF

## 2025-01-28 PROCEDURE — 96374 THER/PROPH/DIAG INJ IV PUSH: CPT

## 2025-01-28 PROCEDURE — 36415 COLL VENOUS BLD VENIPUNCTURE: CPT

## 2025-01-28 PROCEDURE — 99285 EMERGENCY DEPT VISIT HI MDM: CPT | Mod: 25

## 2025-01-28 PROCEDURE — 81001 URINALYSIS AUTO W/SCOPE: CPT

## 2025-01-28 PROCEDURE — 71045 X-RAY EXAM CHEST 1 VIEW: CPT

## 2025-01-28 PROCEDURE — 84132 ASSAY OF SERUM POTASSIUM: CPT | Mod: 59

## 2025-01-28 PROCEDURE — 80307 DRUG TEST PRSMV CHEM ANLYZR: CPT

## 2025-01-28 PROCEDURE — 84702 CHORIONIC GONADOTROPIN TEST: CPT

## 2025-01-28 PROCEDURE — 84484 ASSAY OF TROPONIN QUANT: CPT

## 2025-01-28 PROCEDURE — 99284 EMERGENCY DEPT VISIT MOD MDM: CPT | Mod: 25 | Performed by: EMERGENCY MEDICINE

## 2025-01-28 PROCEDURE — 96361 HYDRATE IV INFUSION ADD-ON: CPT

## 2025-01-28 PROCEDURE — 80053 COMPREHEN METABOLIC PANEL: CPT

## 2025-01-28 PROCEDURE — 87636 SARSCOV2 & INF A&B AMP PRB: CPT

## 2025-01-28 PROCEDURE — 93005 ELECTROCARDIOGRAM TRACING: CPT

## 2025-01-28 PROCEDURE — 83690 ASSAY OF LIPASE: CPT

## 2025-01-28 PROCEDURE — 96375 TX/PRO/DX INJ NEW DRUG ADDON: CPT

## 2025-01-28 PROCEDURE — 2500000002 HC RX 250 W HCPCS SELF ADMINISTERED DRUGS (ALT 637 FOR MEDICARE OP, ALT 636 FOR OP/ED)

## 2025-01-28 PROCEDURE — 80320 DRUG SCREEN QUANTALCOHOLS: CPT

## 2025-01-28 PROCEDURE — 85025 COMPLETE CBC W/AUTO DIFF WBC: CPT

## 2025-01-28 PROCEDURE — 71045 X-RAY EXAM CHEST 1 VIEW: CPT | Performed by: RADIOLOGY

## 2025-01-28 PROCEDURE — 2500000004 HC RX 250 GENERAL PHARMACY W/ HCPCS (ALT 636 FOR OP/ED)

## 2025-01-28 RX ORDER — ONDANSETRON HYDROCHLORIDE 2 MG/ML
4 INJECTION, SOLUTION INTRAVENOUS ONCE
Status: COMPLETED | OUTPATIENT
Start: 2025-01-28 | End: 2025-01-28

## 2025-01-28 RX ORDER — PROCHLORPERAZINE MALEATE 10 MG
10 TABLET ORAL 2 TIMES DAILY PRN
Qty: 10 TABLET | Refills: 0 | Status: SHIPPED | OUTPATIENT
Start: 2025-01-28 | End: 2025-02-02

## 2025-01-28 RX ORDER — POTASSIUM CHLORIDE 20 MEQ/1
40 TABLET, EXTENDED RELEASE ORAL ONCE
Status: COMPLETED | OUTPATIENT
Start: 2025-01-28 | End: 2025-01-28

## 2025-01-28 RX ORDER — POTASSIUM CHLORIDE 750 MG/1
10 TABLET, FILM COATED, EXTENDED RELEASE ORAL 2 TIMES DAILY
Qty: 6 TABLET | Refills: 0 | Status: SHIPPED | OUTPATIENT
Start: 2025-01-28 | End: 2025-01-31

## 2025-01-28 RX ORDER — FAMOTIDINE 20 MG/1
20 TABLET, FILM COATED ORAL 2 TIMES DAILY
Qty: 14 TABLET | Refills: 0 | Status: SHIPPED | OUTPATIENT
Start: 2025-01-28 | End: 2025-02-04

## 2025-01-28 RX ORDER — PROCHLORPERAZINE EDISYLATE 5 MG/ML
5 INJECTION INTRAMUSCULAR; INTRAVENOUS ONCE
Status: COMPLETED | OUTPATIENT
Start: 2025-01-28 | End: 2025-01-28

## 2025-01-28 RX ORDER — FAMOTIDINE 10 MG/ML
20 INJECTION INTRAVENOUS ONCE
Status: COMPLETED | OUTPATIENT
Start: 2025-01-28 | End: 2025-01-28

## 2025-01-28 RX ADMIN — SODIUM CHLORIDE 1000 ML: 9 INJECTION, SOLUTION INTRAVENOUS at 14:06

## 2025-01-28 RX ADMIN — PROCHLORPERAZINE EDISYLATE 5 MG: 5 INJECTION INTRAMUSCULAR; INTRAVENOUS at 15:12

## 2025-01-28 RX ADMIN — ONDANSETRON 4 MG: 2 INJECTION INTRAMUSCULAR; INTRAVENOUS at 13:54

## 2025-01-28 RX ADMIN — FAMOTIDINE 20 MG: 10 INJECTION, SOLUTION INTRAVENOUS at 13:54

## 2025-01-28 RX ADMIN — POTASSIUM CHLORIDE 40 MEQ: 1500 TABLET, EXTENDED RELEASE ORAL at 16:15

## 2025-01-28 ASSESSMENT — COLUMBIA-SUICIDE SEVERITY RATING SCALE - C-SSRS
6. HAVE YOU EVER DONE ANYTHING, STARTED TO DO ANYTHING, OR PREPARED TO DO ANYTHING TO END YOUR LIFE?: NO
1. IN THE PAST MONTH, HAVE YOU WISHED YOU WERE DEAD OR WISHED YOU COULD GO TO SLEEP AND NOT WAKE UP?: NO
2. HAVE YOU ACTUALLY HAD ANY THOUGHTS OF KILLING YOURSELF?: NO

## 2025-01-28 ASSESSMENT — PAIN DESCRIPTION - PROGRESSION: CLINICAL_PROGRESSION: NOT CHANGED

## 2025-01-28 ASSESSMENT — PAIN DESCRIPTION - PAIN TYPE: TYPE: ACUTE PAIN

## 2025-01-28 ASSESSMENT — LIFESTYLE VARIABLES
TOTAL SCORE: 0
HAVE YOU EVER FELT YOU SHOULD CUT DOWN ON YOUR DRINKING: NO
HAVE PEOPLE ANNOYED YOU BY CRITICIZING YOUR DRINKING: NO
EVER HAD A DRINK FIRST THING IN THE MORNING TO STEADY YOUR NERVES TO GET RID OF A HANGOVER: NO
EVER FELT BAD OR GUILTY ABOUT YOUR DRINKING: NO

## 2025-01-28 ASSESSMENT — PAIN DESCRIPTION - LOCATION: LOCATION: ABDOMEN

## 2025-01-28 ASSESSMENT — PAIN DESCRIPTION - ONSET: ONSET: SUDDEN

## 2025-01-28 ASSESSMENT — PAIN SCALES - GENERAL: PAINLEVEL_OUTOF10: 5 - MODERATE PAIN

## 2025-01-28 ASSESSMENT — PAIN DESCRIPTION - FREQUENCY: FREQUENCY: CONSTANT/CONTINUOUS

## 2025-01-28 ASSESSMENT — PAIN DESCRIPTION - DESCRIPTORS: DESCRIPTORS: ACHING

## 2025-01-28 ASSESSMENT — PAIN - FUNCTIONAL ASSESSMENT: PAIN_FUNCTIONAL_ASSESSMENT: 0-10

## 2025-01-28 NOTE — PROGRESS NOTES
"Abena Oneill was assessed by a Substance Use Navigator Specialist (SUNS) at 1:24 PM     Contact Number obtained during encounter:     Medical History:   Past Medical History:   Diagnosis Date    Personal history of other diseases of the respiratory system 02/27/2020    History of sore throat    Personal history of other diseases of urinary system 11/29/2020    History of pyelonephritis    Personal history of other infectious and parasitic diseases 11/23/2020    History of viral infection    Personal history of other specified conditions 11/23/2020    History of fever        Psychiatric History:  NA    Social History:   Social History     Socioeconomic History    Marital status: Single     Spouse name: Not on file    Number of children: Not on file    Years of education: Not on file    Highest education level: Not on file   Occupational History    Not on file   Tobacco Use    Smoking status: Every Day     Current packs/day: 1.00     Types: Cigarettes    Smokeless tobacco: Never   Vaping Use    Vaping status: Every Day    Substances: Nicotine, THC, Flavoring   Substance and Sexual Activity    Alcohol use: Yes     Alcohol/week: 48.0 standard drinks of alcohol     Types: 48 Glasses of wine per week    Drug use: Yes     Types: Marijuana, Fentanyl, Oxycodone, Cocaine    Sexual activity: Defer   Other Topics Concern    Not on file   Social History Narrative    Not on file     Social Drivers of Health     Financial Resource Strain: Not on file   Food Insecurity: Not on file   Transportation Needs: Not on file   Physical Activity: Not on file   Stress: Not on file   Social Connections: Not on file   Intimate Partner Violence: Not on file   Housing Stability: Not on file        UDS / Tox panel results:   Cocaine, amphetamine, fentanyl, cannabis, opiates.   NO ALCOHOL     Substance(s) used: Amphetamine. Amount: NA. Frequency/Route: NA. Last Used: NA. or \"Spice\" or other illegal substances and street drugs - street fake " percocet. Amount: NA. Frequency/Route: pill. Last Used: 1/28/25.     Brief Summary of Assessment:   SUNS met with pt. Pt shared they have never been to inpatient treatment. Pt was tearful / crying and stated they want to talk to their mom. LEXIS asked if pt wanted SUNS to get a phone for pt to call their mom. Pt shared their mom is currently at work. LEXIS asked if pt wanted SUNS to come back in 15 min after pt had a moment.     Update 1:55 PM: LEXIS went back to talk with pt. Pt said they want to take a nap. SUNS shared they can but they can't stay here in the ED for multiple hours. SUNS asked if pt wants treatment. Pt said they don't know, and asked if SUNS can come back in 30 min. LEXIS will talk with pt around 2:30 PM.     Update 2:48 PM: LEXIS talked with pt. Pt shared they are scared to go to inpatient treatment. Pt said they have no one to talk to . Pt shared they are on probation for drug charges. Pt shared they have not ate in 3 days. LEXIS informed pt that if they stop using  they will most likely go through with drawl. Pt said they know and they have been through with drawl before. Pt also shared they do use fentanyl.     Diagnosis / Diagnostic Impression:   POLYSUBSTANCE ABUSE DISORDER     Summary / Plan:  SUNS gave pt resources and harm reduction (fentanyl test strips, narcan, mental health agencies, [facility], SUNS CARD)  Pt not interested in inpatient treatment.     Patient interested in treatment: Yes        Transportation Provided: na

## 2025-01-28 NOTE — ED PROVIDER NOTES
"HPI   Chief Complaint   Patient presents with    Addiction Problem     Pt. States she last used a fake percocet this morning.  States that her chest hurts and she just doesn't feel right.         History provided by:  Patient    Limitations to history:  none    CC: abdominal pain, chest pain    HPI: 24-year-old female previously healthy presents the emergency department to be evaluated for abdominal pain and chest pain.  Patient states that she started not feeling well yesterday.  She reports pain in the center of her chest.  She characterized the pain as \"achy \"the pain is there intermittently and nothing particular makes it better or worse.  Denies personal or family history of heart or lung disease including ACS, arrhythmias, heart failure, DVT or PE, asthma.  Denies recent plane flights, recent surgeries,  The use of estrogen, history of cancer.  Denies pleuritic pain or hemoptysis. denies shortness of breath.  She does vape daily.  Denies fever and chills.  Denies body aches.  Denies runny nose, congestion, sore throat.  She also reports generalized abdominal pain, states it feels similar to the feeling that she is going to throw up.  She has had a few episodes of nausea and dry heaving but no vomiting.  Denies diarrhea and constipation.  Denies any urinary complaints.  Denies blood in the urine or stool.  Denies diarrhea or constipation.  She has not taken any medications for her symptoms prior to arrival.  Patient states that last night she used ecstasy and this morning she used \"fake Percocet \".   patient has been taking street drugs for over a year.  She is not sure if she would like help with detox but is willing to talk to someone about this.  She denies any alcohol abuse.  She denies taking any medications for self-harm.  Denies SI, HI, hallucinations.  Denies all other systemic symptoms.  She denies history of IV drug use or history of endocarditis.    ROS: Negative unless mentioned in HPI    Medical " Hx:    Denies allergies.  Immunizations up-to-date.      Physical exam:    Constitutional: Patient is well-nourished and well-developed.   She appears to be upset but nontoxic in appearance. Oriented to person, place, time, and situation.    HEENT: Head is normocephalic, atraumatic. Patient's airway is patent.  Tympanic membranes are clear bilaterally.  Nasal mucosa clear.  Mouth with normal mucosa.  Throat is not erythematous and there are no oropharyngeal exudates, uvula is midline.  No obvious facial deformities.    Eyes: Clear bilaterally.  Pupils are equal round and reactive to light and accommodation.  Extraocular movements intact.      Cardiac: Regular rate, regular rhythm.  Heart sounds S1, S2.  No murmurs, rubs, or gallops.  PMI nondisplaced.  No JVD.    Respiratory: Regular respiratory rate and effort.  Breath sounds are clear and equal bilaterally, no adventitious lung sounds.  Patient is speaking in full sentences and is in no apparent respiratory distress. No use of accessory muscles.      Gastrointestinal:   Mild suprapubic and epigastric abdominal tenderness evaluation.  Abdomen is soft and nondistended.  There are no obvious deformities.  No rebound tenderness or guarding.  Bowel sounds are normal active.    Genitourinary: No CVA or flank tenderness.    Musculoskeletal: No reproducible tenderness.  No obvious skin or bony deformities.  Patient has equal range of motion in all extremities and no strength deficiencies.  No muscle or joint tenderness. No back or neck tenderness.  Capillary refill less than 3 seconds.  Strong peripheral pulses.  No sensory deficits.    Neurological: Patient is alert and oriented.  No focal deficits.  5/5 strength in all extremities.  Cranial nerves II through XII intact. GCS15.     Skin: Skin is normal color for race and is warm, dry, and intact.  No evidence of trauma.  No lesions, rashes, bruising, jaundice, or masses.    Psych: Appropriate mood and affect.  No  apparent risk to self or others.    Heme/lymph: No adenopathy, lymphadenopathy, or splenomegaly    Physical exam is otherwise negative unless stated above or in history of present illness.              Patient History   Past Medical History:   Diagnosis Date    Personal history of other diseases of the respiratory system 02/27/2020    History of sore throat    Personal history of other diseases of urinary system 11/29/2020    History of pyelonephritis    Personal history of other infectious and parasitic diseases 11/23/2020    History of viral infection    Personal history of other specified conditions 11/23/2020    History of fever     Past Surgical History:   Procedure Laterality Date    CT ANGIO AORTA AND BILATERAL ILIOFEMORAL RUN OFF INCLUDING WITHOUT CONTRAST IF PERFORMED  11/6/2021    CT AORTA AND BILATERAL ILIOFEMORAL RUNOFF ANGIOGRAM W AND/OR WO IV CONTRAST 11/6/2021 [facility] CLINICAL LEGAC[facility]    OTHER SURGICAL HISTORY  11/23/2020    No history of surgery     No family history on file.  Social History     Tobacco Use    Smoking status: Every Day     Current packs/day: 1.00     Types: Cigarettes    Smokeless tobacco: Never   Vaping Use    Vaping status: Every Day    Substances: Nicotine, THC, Flavoring   Substance Use Topics    Alcohol use: Yes     Alcohol/week: 48.0 standard drinks of alcohol     Types: 48 Glasses of wine per week    Drug use: Yes     Types: Marijuana, Fentanyl, Oxycodone, Cocaine       Physical Exam   ED Triage Vitals [01/28/25 1247]   Temperature Heart Rate Respirations BP   36.7 °C (98.1 °F) 61 20 143/66      Pulse Ox Temp Source Heart Rate Source Patient Position   99 % Temporal Monitor Sitting      BP Location FiO2 (%)     Right arm --       Physical Exam      ED Course & MDM   Diagnoses as of 01/28/25 1645   Nausea and vomiting, unspecified vomiting type   Chest pain, unspecified type   Substance abuse (Multi)      patient updated on plan for lab testing, IV insertion, radiology imaging, and  medications to be administered while in the ER (if indicated). Patient updated on expected wait times for testing and results. Patient provided my name and told to ask any staff member for questions or concerns if they should arise. Electronic medical record reviewed.     MDM    Upon initial assessment, patient was healthy non-toxic appearing and in no apparent distress.     Patient presented to the emergency department with the chief complaint  Abdominal pain, chest pain, nausea vomiting. Mild suprapubic and epigastric abdominal tenderness evaluation.  Abdomen is soft and nondistended.  There are no obvious deformities.  No rebound tenderness or guarding.  Bowel sounds are normal active.   No muffled heart sounds, JVD, or murmur.  Patient's breath sounds are clear and equal laterally, 99% on room air.On arrival to the emergency department, vital signs were within normal limits     EKG performed at 1240 and interpreted by me.  Normal sinus rhythm 60 beats.  Normal axis.  Q waves in lead aVL.  No ST elevation depression.  No prolonged QT.        Based on the patient's history and physical exam I have a low suspicion for ovarian torsion, PID, appendicitis, diverticulitis, obstruction, cholecystitis.  Low suspicion for dissection and PE.  Patient's PERC criteria is negative.  Will obtain basic blood work, EKG and troponin, chest x-ray, pregnancy test, COVID and influenza PCR, urinalysis, toxicology screen and urine drug screen.  Patient given IV normal saline as well as Zofran and Pepcid for her symptoms.  I was able to speak to the  who will discuss the patient's history of substance abuse and give the patient resources and potential detox options      Patient's PCR swabs are negative.  Original and repeat troponin within normal limits.  Heart score is 0.  Lipase is 11.  Pregnancy test is negative.  CMP reveals hemolyzed potassium 5.5, patient's repeat is 3.0.  Patient was given oral potassium.       Patient's toxicology panel is negative.  Lipase is 11.  CBC reveals a leukocytosis 15.4 with a left shift neutrophil count.  At this time there are no obvious signs of bacterial infection, this could be from a stress reaction or from her vomiting.  Her drug screen was positive for methadone, opiates, fentanyl, cocaine, marijuana, amphetamines.  Urinalysis reveals signs of dehydration including protein and ketones but no sign of urinary tract infection.  Chest x-ray is unremarkable.  No sign of pneumonia or pneumothorax.  Patient has no history or physical exam findings of just endocarditis, myocarditis, pericarditis.  Patient originally was still having some nausea with the Zofran, she is feeling much better after the Compazine.      Social work did speak to the patient and offered detox treatment however patient declined at this time.  She was willing to accept resources and information.  Patient feels well and would prefer to be discharged.  She will be discharged with Compazine, oral potassium for few days, and Pepcid.  I discussed supportive treatment.  Discussed the brat diet and drinking plenty of fluids and resting.  All questions and concerns addressed.  Reasons to return to ER discussed.  Patient verbalized understanding and agreement with the treatment plan and they remained hemodynamically stable in the ER.    This note was dictated using a speech recognition program.  While an attempt was made at proof-reading to minimize errors, minor errors in transcription may be present         No data recorded     Letitia Coma Scale Score: 15 (01/28/25 1242 : Leonid Salgado RN)                           Medical Decision Making        Shared VINNY Attestation:    I personally saw the patient and made/approved the management plan and take responsibility for the patient management.     History: 24-year-old female presents with abdominal and chest pain and drug use.    Exam: Regular rate and rhythm cardiac exam with  clear breath sounds bilaterally.  Abdomen is soft with mild generalized tenderness to palpation.  Neurological exam is grossly intact.  Negative Homans' sign bilaterally.    MDM: Tox, ACS, arrhythmia, electrolyte disorder    Labs Reviewed   CBC WITH AUTO DIFFERENTIAL - Abnormal       Result Value    WBC 15.4 (*)     nRBC 0.0      RBC 4.82      Hemoglobin 15.4      Hematocrit 42.4      MCV 88      MCH 32.0      MCHC 36.3 (*)     RDW 12.2      Platelets 349      Neutrophils % 85.8      Immature Granulocytes %, Automated 0.5      Lymphocytes % 10.0      Monocytes % 3.5      Eosinophils % 0.0      Basophils % 0.2      Neutrophils Absolute 13.22 (*)     Immature Granulocytes Absolute, Automated 0.07      Lymphocytes Absolute 1.54      Monocytes Absolute 0.54      Eosinophils Absolute 0.00      Basophils Absolute 0.03     COMPREHENSIVE METABOLIC PANEL - Abnormal    Glucose 111 (*)     Sodium 136      Potassium 5.5 (*)     Chloride 96 (*)     Bicarbonate 27      Anion Gap 19      Urea Nitrogen 20      Creatinine 0.84      eGFR >90      Calcium 10.0      Albumin 5.6 (*)     Alkaline Phosphatase 65      Total Protein 8.8 (*)     AST 32      Bilirubin, Total 0.6      ALT 13     DRUG SCREEN,URINE - Abnormal    Amphetamine Screen, Urine Presumptive Positive (*)     Barbiturate Screen, Urine Presumptive Negative      Benzodiazepines Screen, Urine Presumptive Negative      Cannabinoid Screen, Urine Presumptive Positive (*)     Cocaine Metabolite Screen, Urine Presumptive Positive (*)     Fentanyl Screen, Urine Presumptive Positive (*)     Opiate Screen, Urine Presumptive Positive (*)     Oxycodone Screen, Urine Presumptive Negative      PCP Screen, Urine Presumptive Negative      Methadone Screen, Urine Presumptive Positive (*)     Narrative:     Drug screen results are presumptive and should not be used to assess   compliance with prescribed medication. Contact the performing [facility] laboratory   to add-on definitive confirmatory  testing if clinically indicated.    Toxicology screening results are reported qualitatively. The concentration must   be greater than or equal to the cutoff to be reported as positive. The concentration   at which the screening test can detect an individual drug or metabolite varies.   The absence of expected drug(s) and/or drug metabolite(s) may indicate non-compliance,   inappropriate timing of specimen collection relative to drug administration, poor drug   absorption, diluted/adulterated urine, or limitations of testing. For medical purposes   only; not valid for forensic use.    Interpretive questions should be directed to the laboratory medical directors.   URINALYSIS WITH REFLEX CULTURE AND MICROSCOPIC - Abnormal    Color, Urine Yellow      Appearance, Urine Ex.Turbid (*)     Specific Gravity, Urine 1.041 (*)     pH, Urine 5.5      Protein, Urine 50 (1+) (*)     Glucose, Urine Normal      Blood, Urine NEGATIVE      Ketones, Urine 100 (3+) (*)     Bilirubin, Urine NEGATIVE      Urobilinogen, Urine Normal      Nitrite, Urine NEGATIVE      Leukocyte Esterase, Urine NEGATIVE     LIPASE - Normal    Lipase 11      Narrative:     Venipuncture immediately after or during the administration of Metamizole may lead to falsely low results. Testing should be performed immediately prior to Metamizole dosing.   ACUTE TOXICOLOGY PANEL, BLOOD - Normal    Acetaminophen <10.0      Salicylate  <3      Alcohol <10     TROPONIN SERIES- (INITIAL, 1 HR)    Narrative:     The following orders were created for panel order Troponin I Series, High Sensitivity (0, 1 HR).  Procedure                               Abnormality         Status                     ---------                               -----------         ------                     Troponin I, High Sensiti...[[id number]]                      In process                 Troponin, High Sensitivi...[[id number]]                                                   Please view results  for these tests on the individual orders.   HUMAN CHORIONIC GONADOTROPIN, SERUM QUANTITATIVE   SARS-COV-2 AND INFLUENZA A/B PCR   URINALYSIS WITH REFLEX CULTURE AND MICROSCOPIC    Narrative:     The following orders were created for panel order Urinalysis with Reflex Culture and Microscopic.  Procedure                               Abnormality         Status                     ---------                               -----------         ------                     Urinalysis with Reflex C...[[id number]]  Abnormal            Final result               Extra Urine Gray Tube[[id number]]                            In process                   Please view results for these tests on the individual orders.   SERIAL TROPONIN-INITIAL   EXTRA URINE GRAY TUBE   SERIAL TROPONIN, 1 HOUR   POTASSIUM   URINALYSIS MICROSCOPIC WITH REFLEX CULTURE    WBC, Urine NONE      RBC, Urine NONE      Squamous Epithelial Cells, Urine 1-9 (SPARSE)      Mucus, Urine 4+         XR chest 1 view   Final Result   No evidence of acute intrathoracic abnormality.        Signed by: Sebas Brooks 1/28/2025 1:30 PM   Dictation workstation:   [id number]              Missael Ye MD      Procedure  Procedures     Jorge Luis Null PA-C  01/28/25 9648

## 2025-01-29 LAB
ATRIAL RATE: 68 BPM
HOLD SPECIMEN: NORMAL
P AXIS: 80 DEGREES
P OFFSET: 204 MS
P ONSET: 152 MS
PR INTERVAL: 138 MS
Q ONSET: 221 MS
QRS COUNT: 11 BEATS
QRS DURATION: 76 MS
QT INTERVAL: 420 MS
QTC CALCULATION(BAZETT): 446 MS
QTC FREDERICIA: 438 MS
R AXIS: 69 DEGREES
T AXIS: 23 DEGREES
T OFFSET: 431 MS
VENTRICULAR RATE: 68 BPM

## 2025-02-11 ENCOUNTER — TELEPHONE (OUTPATIENT)
Dept: CASE MANAGEMENT | Facility: HOSPITAL | Age: 25
End: 2025-02-11
Payer: COMMERCIAL

## 2025-02-11 NOTE — TELEPHONE ENCOUNTER
This is an audio only call. This audio call was conducted in the [facility].     Substance Use Navigator Specialist (LEXIS) performed an audio only follow-up call with Abena Oneill at 9:31 AM     Was SUNS able to reach patient? Yes    If No, did SUNS leave a voicemail message?     Brief summary of call:  SUNS outreached pt. Pt shared they are looking for help and maybe 30 day treatment. Pt shared they may not want to stay for 30 days but 2-3 weeks. Pt shared they will call SUNS when  they are ready fore treatment.     Follow-up / Next steps:   SUNS shared with pt whenever they are ready for treatment they are welcome to call SUNS.   End of call time:   9:31 am  Duration of audio encounter: SUNS Audio call - Minutes: 5-10 minutes

## 2025-02-25 LAB
ATRIAL RATE: 68 BPM
P AXIS: 80 DEGREES
P OFFSET: 204 MS
P ONSET: 152 MS
PR INTERVAL: 138 MS
Q ONSET: 221 MS
QRS COUNT: 11 BEATS
QRS DURATION: 76 MS
QT INTERVAL: 420 MS
QTC CALCULATION(BAZETT): 446 MS
QTC FREDERICIA: 438 MS
R AXIS: 69 DEGREES
T AXIS: 23 DEGREES
T OFFSET: 431 MS
VENTRICULAR RATE: 68 BPM

## 2025-02-27 ENCOUNTER — TELEPHONE (OUTPATIENT)
Dept: CASE MANAGEMENT | Facility: HOSPITAL | Age: 25
End: 2025-02-27
Payer: COMMERCIAL

## 2025-02-27 NOTE — TELEPHONE ENCOUNTER
This is an audio only call. This audio call was conducted in the [facility].     Substance Use Navigator Specialist (LEXIS) performed an audio only follow-up call with Abena Oneill at 11:27 AM     Was SUNS able to reach patient? No    If No, did MEHRANS leave a voicemail message? No mailbox not set up, suns sent a outreach text     Brief summary of call:    Follow-up / Next steps:     End of call time:     Duration of audio encounter:

## 2025-03-06 ENCOUNTER — TELEPHONE (OUTPATIENT)
Dept: CASE MANAGEMENT | Facility: HOSPITAL | Age: 25
End: 2025-03-06
Payer: COMMERCIAL

## 2025-03-06 NOTE — TELEPHONE ENCOUNTER
This is an audio only call. This audio call was conducted in the [facility].     Substance Use Navigator Specialist (LEXIS) performed an audio only follow-up call with Abena Oneill at 1:31 PM     Was SUNS able to reach patient? No    If No, did MEHRANS leave a voicemail message? Yes LEFT CALL BACK NUMBER AND ASKED FOR A CALL BACK     Brief summary of call:    Follow-up / Next steps:     End of call time:     Duration of audio encounter:

## (undated) DEVICE — TUBING, SUCTION, 1/4" X 10', STRAIGHT: Brand: MEDLINE

## (undated) DEVICE — LABEL MED MINI W/ MARKER

## (undated) DEVICE — INTENDED FOR TISSUE SEPARATION, AND OTHER PROCEDURES THAT REQUIRE A SHARP SURGICAL BLADE TO PUNCTURE OR CUT.: Brand: BARD-PARKER ® CARBON RIB-BACK BLADES

## (undated) DEVICE — SYRINGE IRRIG 60ML SFT PLIABLE BLB EZ TO GRP 1 HND USE W/

## (undated) DEVICE — APPLICATOR MEDICATED 26 CC SOLUTION HI LT ORNG CHLORAPREP

## (undated) DEVICE — SPONGE,LAP,18"X18",DLX,XR,ST,5/PK,40/PK: Brand: MEDLINE

## (undated) DEVICE — GOWN,AURORA,NONREINFORCED,LARGE: Brand: MEDLINE

## (undated) DEVICE — GAUZE,SPONGE,FLUFF,6"X6.75",STRL,10/TRAY: Brand: MEDLINE

## (undated) DEVICE — GOWN,SIRUS,POLYRNF,BRTHSLV,XLN/XL,20/CS: Brand: MEDLINE

## (undated) DEVICE — GLOVE ORANGE PI 8   MSG9080

## (undated) DEVICE — BANDAGE,ELASTIC,ESMARK,STERILE,6"X9',LF: Brand: MEDLINE

## (undated) DEVICE — ELECTRODE PT RET AD L9FT HI MOIST COND ADH HYDRGEL CORDED

## (undated) DEVICE — BANDAGE COBAN 4 IN COMPR W4INXL5YD FOAM COHESIVE QUIK STK SELF ADH SFT

## (undated) DEVICE — ZIMMER® STERILE DISPOSABLE TOURNIQUET CUFF, DUAL PORT, SINGLE BLADDER, 34 IN. (86 CM)

## (undated) DEVICE — DRAPE, C-ARM, BANDS, 41X120: Brand: MEDLINE

## (undated) DEVICE — COUNTER NDL 40 COUNT HLD 70 FOAM BLK ADH W/ MAG

## (undated) DEVICE — BIT DRL L110MM DIA2.5MM G QUIK CPL W/O STP REUSE

## (undated) DEVICE — SHEET,DRAPE,53X77,STERILE: Brand: MEDLINE

## (undated) DEVICE — SINGLE PORT MANIFOLD: Brand: NEPTUNE 2

## (undated) DEVICE — PACK ARTHRO III ST SIRUS

## (undated) DEVICE — SPLINT CAST W5XL30IN GRN STRENGTH PLSTR OF PARIS FAST SET

## (undated) DEVICE — BANDAGE COMPR M W4INXL10YD WHT BGE VELC E MTRX HK AND LOOP

## (undated) DEVICE — TAP SURG L110MM QUIK CPL FOR 3.5MM SCR

## (undated) DEVICE — COVER LT HNDL BLU PLAS

## (undated) DEVICE — ABSORBENT ROLL ECODRI-SAFE

## (undated) DEVICE — PADDING,UNDERCAST,COTTON, 4"X4YD STERILE: Brand: MEDLINE

## (undated) DEVICE — MARKER SURG SKIN GENTIAN VLT REG TIP W/ 6IN RUL

## (undated) DEVICE — SUTURE MCRYL SZ 3-0 L27IN ABSRB UD L19MM PS-2 3/8 CIR PRIM Y427H

## (undated) DEVICE — NEPTUNE E-SEP SMOKE EVACUATION PENCIL, COATED, 70MM BLADE, PUSH BUTTON SWITCH: Brand: NEPTUNE E-SEP

## (undated) DEVICE — SUTURE VCRL SZ 2-0 L27IN ABSRB UD L26MM CT-2 1/2 CIR J269H

## (undated) DEVICE — DRESSING GZ W1XL8IN COT XRFRM N ADH OVERWRAP CURAD